# Patient Record
Sex: FEMALE | Race: WHITE | NOT HISPANIC OR LATINO | Employment: UNEMPLOYED | URBAN - METROPOLITAN AREA
[De-identification: names, ages, dates, MRNs, and addresses within clinical notes are randomized per-mention and may not be internally consistent; named-entity substitution may affect disease eponyms.]

---

## 2020-05-28 ENCOUNTER — AMBULATORY - HEALTHEAST (OUTPATIENT)
Dept: SURGERY | Facility: CLINIC | Age: 55
End: 2020-05-28

## 2020-05-28 DIAGNOSIS — Z11.59 ENCOUNTER FOR SCREENING FOR OTHER VIRAL DISEASES: ICD-10-CM

## 2020-06-29 ENCOUNTER — RECORDS - HEALTHEAST (OUTPATIENT)
Dept: LAB | Facility: CLINIC | Age: 55
End: 2020-06-29

## 2020-06-29 LAB
ANION GAP SERPL CALCULATED.3IONS-SCNC: 13 MMOL/L (ref 5–18)
BUN SERPL-MCNC: 11 MG/DL (ref 8–22)
CALCIUM SERPL-MCNC: 9.6 MG/DL (ref 8.5–10.5)
CHLORIDE BLD-SCNC: 104 MMOL/L (ref 98–107)
CO2 SERPL-SCNC: 23 MMOL/L (ref 22–31)
CREAT SERPL-MCNC: 1.06 MG/DL (ref 0.6–1.1)
GFR SERPL CREATININE-BSD FRML MDRD: 54 ML/MIN/1.73M2
GLUCOSE BLD-MCNC: 107 MG/DL (ref 70–125)
POTASSIUM BLD-SCNC: 3.9 MMOL/L (ref 3.5–5)
SODIUM SERPL-SCNC: 140 MMOL/L (ref 136–145)

## 2020-07-08 ASSESSMENT — MIFFLIN-ST. JEOR: SCORE: 1436.83

## 2020-07-11 ENCOUNTER — AMBULATORY - HEALTHEAST (OUTPATIENT)
Dept: FAMILY MEDICINE | Facility: CLINIC | Age: 55
End: 2020-07-11

## 2020-07-11 DIAGNOSIS — Z11.59 ENCOUNTER FOR SCREENING FOR OTHER VIRAL DISEASES: ICD-10-CM

## 2020-07-14 ENCOUNTER — ANESTHESIA - HEALTHEAST (OUTPATIENT)
Dept: SURGERY | Facility: CLINIC | Age: 55
End: 2020-07-14

## 2020-07-14 ENCOUNTER — SURGERY - HEALTHEAST (OUTPATIENT)
Dept: SURGERY | Facility: CLINIC | Age: 55
End: 2020-07-14

## 2020-07-14 ASSESSMENT — MIFFLIN-ST. JEOR: SCORE: 1436.83

## 2021-06-04 VITALS — BODY MASS INDEX: 32.44 KG/M2 | WEIGHT: 190 LBS | HEIGHT: 64 IN

## 2021-06-09 NOTE — ANESTHESIA PREPROCEDURE EVALUATION
Anesthesia Evaluation      Patient summary reviewed   History of anesthetic complications     Airway    Pulmonary                           Cardiovascular   Exercise tolerance: > or = 4 METS  (+) , hypercholesterolemia,     (-) valvular problems/murmurs, past MI, CAD  ECG reviewed        Neuro/Psych      Comments: Migraine; Motion sickness; MS.    Endo/Other    (+) hypothyroidism, arthritis, obesity,      GI/Hepatic/Renal - negative ROS      Other findings: PONV.      Dental                         Anesthesia Plan  Planned anesthetic: general endotracheal and peripheral nerve block  Saphenous nerve block for POP per surgeon request. Scopolamine, Decadron, Zofran.  Diprivan infusion.  Magnesium, Ketamine (0.5 mg/kg).  ASA 2   Induction: intravenous   Anesthetic plan and risks discussed with: patient  Anesthesia plan special considerations: antiemetics,   Post-op plan: routine recovery

## 2021-06-09 NOTE — ANESTHESIA CARE TRANSFER NOTE
Last vitals:   Vitals:    07/14/20 1253   BP: 138/73   Pulse: (!) 102   Resp: 14   Temp: 36.6  C (97.9  F)   SpO2: 97%     Patient's level of consciousness is drowsy  Spontaneous respirations: yes  Maintains airway independently: yes  Dentition unchanged: yes  Oropharynx: oropharynx clear of all foreign objects    QCDR Measures:  ASA# 20 - Surgical Safety Checklist: WHO surgical safety checklist completed prior to induction    PQRS# 430 - Adult PONV Prevention: 4558F - Pt received => 2 anti-emetic agents (different classes) preop & intraop  ASA# 8 - Peds PONV Prevention: NA - Not pediatric patient, not GA or 2 or more risk factors NOT present  PQRS# 424 - Radha-op Temp Management: 4559F - At least one body temp DOCUMENTED => 35.5C or 95.9F within required timeframe  PQRS# 426 - PACU Transfer Protocol: - Transfer of care checklist used  ASA# 14 - Acute Post-op Pain: ASA14B - Patient did NOT experience pain >= 7 out of 10. Denies pain or nausea

## 2021-06-09 NOTE — ANESTHESIA POSTPROCEDURE EVALUATION
Patient: Chelsi Robertson  Procedure(s):  RIGHT TOTAL KNEE ARTHROPLASTY (Right)  Anesthesia type: general    Patient location: PACU  Last vitals:   Vitals Value Taken Time   /96 7/14/2020  1:30 PM   Temp 36.6  C (97.9  F) 7/14/2020 12:53 PM   Pulse 99 7/14/2020  1:34 PM   Resp 15 7/14/2020  1:34 PM   SpO2 90 % 7/14/2020  1:34 PM   Vitals shown include unvalidated device data.  Post vital signs: stable  Level of consciousness: awake and responds to simple questions  Post-anesthesia pain: pain controlled  Post-anesthesia nausea and vomiting: no  Pulmonary: unassisted, return to baseline  Cardiovascular: stable and blood pressure at baseline  Hydration: adequate  Anesthetic events: no    QCDR Measures:  ASA# 11 - Radha-op Cardiac Arrest: ASA11B - Patient did NOT experience unanticipated cardiac arrest  ASA# 12 - Radha-op Mortality Rate: ASA12B - Patient did NOT die  ASA# 13 - PACU Re-Intubation Rate: ASA13B - Patient did NOT require a new airway mgmt  ASA# 10 - Composite Anes Safety: ASA10A - No serious adverse event    Additional Notes:

## 2021-06-09 NOTE — ANESTHESIA PROCEDURE NOTES
Peripheral Block    Patient location during procedure: pre-op  Start time: 7/14/2020 10:32 AM  End time: 7/14/2020 10:40 AM  post-op analgesia per surgeon order as noted in medical record  Staffing:  Performing  Anesthesiologist: Kermit Tavares MD  Preanesthetic Checklist  Completed: patient identified, site marked, risks, benefits, and alternatives discussed, timeout performed, consent obtained, at patient's request, airway assessed, oxygen available, suction available, emergency drugs available and hand hygiene performed  Peripheral Block  Block type: saphenous, adductor canal block  Prep: ChloraPrep  Patient position: supine  Patient monitoring: cardiac monitor, continuous pulse oximetry, blood pressure and heart rate  Laterality: right  Injection technique: ultrasound guided    Ultrasound used to visualize needle placement in proximity to nerve being blocked: yes   US used to visualize anesthetic spread  Visualized anatomic structures normal  No Pathological Findings  Permanent ultrasound image captured for medical record  Sterile gel and probe cover used for ultrasound.  Needle  Needle type: Stimuplex   Needle gauge: 20G  Needle length: 6 in  no peripheral nerve catheter placed  Assessment  Injection assessment: negative aspiration for heme, no difficulty with injection, no paresthesia on injection and incremental injection

## 2021-07-03 NOTE — ADDENDUM NOTE
Addendum Note by Shelby Daly RN at 5/28/2020 12:32 PM     Author: Shelby Daly RN Service: -- Author Type: Registered Nurse    Filed: 6/29/2020  8:59 AM Encounter Date: 5/28/2020 Status: Signed    : Shelby Daly RN (Registered Nurse)    Addended by: SHELBY DALY on: 6/29/2020 08:59 AM        Modules accepted: Orders

## 2022-04-15 ENCOUNTER — LAB REQUISITION (OUTPATIENT)
Dept: LAB | Facility: CLINIC | Age: 57
End: 2022-04-15

## 2022-04-15 DIAGNOSIS — E03.9 HYPOTHYROIDISM, UNSPECIFIED: ICD-10-CM

## 2022-04-15 LAB — TSH SERPL DL<=0.005 MIU/L-ACNC: 3.2 UIU/ML (ref 0.3–5)

## 2022-04-15 PROCEDURE — 84443 ASSAY THYROID STIM HORMONE: CPT | Performed by: PHYSICIAN ASSISTANT

## 2022-05-12 ENCOUNTER — TELEPHONE (OUTPATIENT)
Dept: NEUROLOGY | Facility: CLINIC | Age: 57
End: 2022-05-12
Payer: COMMERCIAL

## 2022-05-12 NOTE — TELEPHONE ENCOUNTER
[facility] Call Center    Phone Message    May a detailed message be left on voicemail: yes     Reason for Call: Other: Pt called to schedule apt with Dr. Montero. Pt saw Dr. Montero at [facility]placed by [facility] for  [facility] and wants to continue ans prefers [facility].    Please call Pt to schedule [phone] when ready to schedule.    Action Taken: Message routed to:  [facility] ([facility] Neurology    Travel Screening: Not Applicable

## 2022-06-02 ENCOUNTER — OFFICE VISIT (OUTPATIENT)
Dept: NEUROLOGY | Facility: CLINIC | Age: 57
End: 2022-06-02
Payer: COMMERCIAL

## 2022-06-02 ENCOUNTER — TELEPHONE (OUTPATIENT)
Dept: NEUROLOGY | Facility: CLINIC | Age: 57
End: 2022-06-02

## 2022-06-02 VITALS — HEART RATE: 94 BPM | SYSTOLIC BLOOD PRESSURE: 118 MMHG | DIASTOLIC BLOOD PRESSURE: 93 MMHG

## 2022-06-02 DIAGNOSIS — F41.9 ANXIETY: ICD-10-CM

## 2022-06-02 DIAGNOSIS — G81.91 RIGHT HEMIPLEGIA (H): ICD-10-CM

## 2022-06-02 DIAGNOSIS — G35 MS (MULTIPLE SCLEROSIS) (H): Primary | ICD-10-CM

## 2022-06-02 DIAGNOSIS — M85.852 OSTEOPENIA OF LEFT HIP: ICD-10-CM

## 2022-06-02 DIAGNOSIS — F51.04 CHRONIC INSOMNIA: ICD-10-CM

## 2022-06-02 DIAGNOSIS — G82.50 SPASTIC QUADRIPARESIS (H): ICD-10-CM

## 2022-06-02 DIAGNOSIS — G35 MULTIPLE SCLEROSIS (H): ICD-10-CM

## 2022-06-02 PROCEDURE — 99215 OFFICE O/P EST HI 40 MIN: CPT | Performed by: PSYCHIATRY & NEUROLOGY

## 2022-06-02 RX ORDER — NALOXONE HYDROCHLORIDE 0.4 MG/ML
0.2 INJECTION, SOLUTION INTRAMUSCULAR; INTRAVENOUS; SUBCUTANEOUS
Status: CANCELLED | OUTPATIENT
Start: 2022-06-02

## 2022-06-02 RX ORDER — ALBUTEROL SULFATE 0.83 MG/ML
2.5 SOLUTION RESPIRATORY (INHALATION)
Status: CANCELLED | OUTPATIENT
Start: 2022-06-02

## 2022-06-02 RX ORDER — LORAZEPAM 0.5 MG/1
0.5 TABLET ORAL
Qty: 30 TABLET | Refills: 1 | Status: SHIPPED | OUTPATIENT
Start: 2022-06-02 | End: 2023-06-07

## 2022-06-02 RX ORDER — TRAZODONE HYDROCHLORIDE 50 MG/1
100 TABLET, FILM COATED ORAL AT BEDTIME
Qty: 60 TABLET | Refills: 11 | Status: SHIPPED | OUTPATIENT
Start: 2022-06-02 | End: 2023-06-12

## 2022-06-02 RX ORDER — HEPARIN SODIUM (PORCINE) LOCK FLUSH IV SOLN 100 UNIT/ML 100 UNIT/ML
5 SOLUTION INTRAVENOUS
Status: CANCELLED | OUTPATIENT
Start: 2022-06-02

## 2022-06-02 RX ORDER — EPINEPHRINE 1 MG/ML
0.3 INJECTION, SOLUTION, CONCENTRATE INTRAVENOUS EVERY 5 MIN PRN
Status: CANCELLED | OUTPATIENT
Start: 2022-06-02

## 2022-06-02 RX ORDER — ALBUTEROL SULFATE 90 UG/1
1-2 AEROSOL, METERED RESPIRATORY (INHALATION)
Status: CANCELLED
Start: 2022-06-02

## 2022-06-02 RX ORDER — HEPARIN SODIUM,PORCINE 10 UNIT/ML
5 VIAL (ML) INTRAVENOUS
Status: CANCELLED | OUTPATIENT
Start: 2022-06-02

## 2022-06-02 RX ORDER — MEPERIDINE HYDROCHLORIDE 25 MG/ML
25 INJECTION INTRAMUSCULAR; INTRAVENOUS; SUBCUTANEOUS EVERY 30 MIN PRN
Status: CANCELLED | OUTPATIENT
Start: 2022-06-02

## 2022-06-02 RX ORDER — DIPHENHYDRAMINE HYDROCHLORIDE 50 MG/ML
50 INJECTION INTRAMUSCULAR; INTRAVENOUS
Status: CANCELLED
Start: 2022-06-02

## 2022-06-02 RX ORDER — DEXTROAMPHETAMINE SACCHARATE, AMPHETAMINE ASPARTATE, DEXTROAMPHETAMINE SULFATE AND AMPHETAMINE SULFATE 3.75; 3.75; 3.75; 3.75 MG/1; MG/1; MG/1; MG/1
30 TABLET ORAL 2 TIMES DAILY
Qty: 120 TABLET | Refills: 0 | Status: SHIPPED | OUTPATIENT
Start: 2022-06-02 | End: 2022-08-16

## 2022-06-02 RX ORDER — METHYLPREDNISOLONE SODIUM SUCCINATE 125 MG/2ML
125 INJECTION, POWDER, LYOPHILIZED, FOR SOLUTION INTRAMUSCULAR; INTRAVENOUS
Status: CANCELLED
Start: 2022-06-02

## 2022-06-02 NOTE — TELEPHONE ENCOUNTER
Signed plan for one dose    Patient was instructed to get DEXA scan before additional doses will be orderd  Gale Montero MD on 6/2/2022 at 12:57 PM

## 2022-06-02 NOTE — LETTER
6/2/2022         RE: Chelsi Robertson  [address] [address]        Dear Colleague,    Thank you for referring your patient, Chelsi Robertson, to the [facility]. Please see a copy of my visit note below.    Date of Service: 6/2/2022    [facility] Neurology   [facility] Follow-up     Subjective: 57-year-old woman who presents in follow-up for multiple sclerosis.    There have been a number of events since her last visit with me.  I most recently saw her on February 7.  At that time she was struggling significantly with stress at work.  She was noticing impairments with her cognition and difficulty keeping up with her workload.  She is considering short-term disability leave.    She continued working through the end of March.  Her last full day of work was March 30.  She attended work on April 4, but simply could not complete the day.    She notes that her workload was much more than she could manage.  Each client would have multiple forms that she would have to fill out.  They required her to type.  Her right hand was getting weaker.  Her entire right arm would go numb.  She would then have to completely rely on her left hand.  But her left hand would become fatigued within 2 hours of work.  She also had difficulty maintaining attention on a task because her attention and short-term memory were so poor.  She would forget to complete case notes because she was so busy trying to keep up with the forms.    She is no longer able to cook because her right hand is so weak.  She is not able to cut food.    She has been resting for the past month.  She notes that she is just now starting to feel better.  She is sleeping a lot.  She wears slippers because her shoes are difficult to get on.  She is no longer driving because it is difficult for her if she goes any longer than a very short distance.    She continues to use glatiramer acetate and reports tolerating this  well.    She has not experienced any new symptoms related to multiple sclerosis, she simply notices the progression of chronic symptoms.    She is interested in resuming monthly steroids.  She is agreeable to updating imaging to check in on her history of osteopenia.    No Known Allergies    Current Outpatient Medications   Medication     amphetamine-dextroamphetamine (ADDERALL) 15 MG tablet     DULoxetine (CYMBALTA) 30 MG capsule     gabapentin (NEURONTIN) 300 MG capsule     glatiramer (COPAXONE) 40 mg/mL Syrg injection     levothyroxine (SYNTHROID, LEVOTHROID) 75 MCG tablet     LORazepam (ATIVAN) 0.5 MG tablet     SUMAtriptan (IMITREX) 50 MG tablet     tiZANidine (ZANAFLEX) 4 MG tablet     traZODone (DESYREL) 50 MG tablet     triamcinolone (KENALOG) 0.1 % ointment     cholecalciferol, vitamin D3, (VITAMIN D3) 5,000 unit Tab     No current facility-administered medications for this visit.        Past medical, surgical, social and family history was personally reviewed. Pertinent details noted above.     Physical Examination:   BP (!) 118/93 (BP Location: Right arm, Patient Position: Sitting)   Pulse 94     General: no acute distress  Cranial nerves:   VFFC  EOM full w/no ABIGAIL   Face symmetric  Hearing intact  No dysarthria   Motor:   Tone is increased in the right > left lower extremities  Bulk is reduced in the right hand    R L  Deltoid  5- 5  Biceps  5- 5  Triceps 4+ 5  Wrist ext 2 4+  Finger ext 1 4+  Finger abd 1 4+    Hip flexion 1 4  Knee flexion 4 5-  Knee ext 5- 5  Ankle d/f 2 4+    Reflexes: increased on left side, reduced on the right side  Sensory: vibration is moderately reduced in the ankles, mildly reduced in the hands, JPS intact in UE  Romberg is not assessed  Coordination: sensory ataxia right upper extremity   Gait: wide based right hemiplegic with assist of walker    Tests/Imaging:   MRI brain 2010 - multiple CNS lesions c/w ms, no enhancing brain lesions  6/2011 - 2 new lesions, 1 melinda+  1/2017  "- no new lesions, gd-, some progression of atrophy  7/2020 - no new lesions, gd-    MRI cervical spine 2010 - lesions at C2-3 and C3 w/o enhancement  1/2017 - no new lesions, gd-, some spondylosis  7/2020 - no new lesions, gd-    MRI thoracic spine 2010 - no thoracic cord lesions  7/2020 - no new lesions, gd-    DEXA 1/2019 - osteopenia    NP eval 4/15/19  Impaired problem-solving and complex visual attention  Subtle impairment in \"retrieval based naming\"  Relative impairment of executive functioning with variable auditory and visual attention    Assessment: 57-year-old woman with secondary progressive multiple sclerosis.  She has experienced a clinical decline that is attributable to her progressive phase of the disease.    I support her in pursuing short-term disability.  Long-term disability is also reasonable as her cognitive and physical impairments are not expected to improve, and are likely to only continue to worsen.    It should be noted that she had a neuropsychological examination in 2019 that revealed only mild impairments in executive functioning.  However, this is expected to worsen in the setting of stress and in busy environments.  The testing is performed in a relatively protected environment, which is not reflective of a typical work environment or workday.    She has considerable right hand weakness with no fine motor skills in the right hand, and is also experiencing fatigable weakness in the left hand.    Plan:   -Continue with tizanidine for management of muscle spasticity  - Continue Adderall for MS related fatigue and cognitive changes  - Okay to use lorazepam on a limited basis for management of anxiety  - Trazodone for chronic insomnia  - Home care referral placed for occupational therapy and physical therapy  - DEXA scan to reassess for osteoporosis  - IV steroids x1 now, but will resume monthly infusions if DEXA scan does not reveal progression of bone disease  - Follow-up in 3 " months    Note was completed with the assistance of Dragon Fluency software which can often result in accidental word substitutions.     A total of 45 minutes on the date of service were spent in the care of this patient.   Gale Montero MD on 6/2/2022 at 10:52 AM          Again, thank you for allowing me to participate in the care of your patient.        Sincerely,        Gale Montero MD

## 2022-06-02 NOTE — TELEPHONE ENCOUNTER
Mary Beth to receive monthly steroid infusions.  Would like to infuse at [facility].  Mary Beth provided the infusion scheduling number. Therapy plan routed to Dr. Montero for signature.    Sasha Dominique RN

## 2022-06-02 NOTE — PATIENT INSTRUCTIONS
I will support your leave    I have referred you to home care     Refills provided    Steroid monthly x 1  You need the dexa scan before additional doses    Follow up in 3-4 months

## 2022-06-02 NOTE — PROGRESS NOTES
Date of Service: 6/2/2022    [facility] Neurology   [facility] Clinic Follow-up     Subjective: 57-year-old woman who presents in follow-up for multiple sclerosis.    There have been a number of events since her last visit with me.  I most recently saw her on February 7.  At that time she was struggling significantly with stress at work.  She was noticing impairments with her cognition and difficulty keeping up with her workload.  She is considering short-term disability leave.    She continued working through the end of March.  Her last full day of work was March 30.  She attended work on April 4, but simply could not complete the day.    She notes that her workload was much more than she could manage.  Each client would have multiple forms that she would have to fill out.  They required her to type.  Her right hand was getting weaker.  Her entire right arm would go numb.  She would then have to completely rely on her left hand.  But her left hand would become fatigued within 2 hours of work.  She also had difficulty maintaining attention on a task because her attention and short-term memory were so poor.  She would forget to complete case notes because she was so busy trying to keep up with the forms.    She is no longer able to cook because her right hand is so weak.  She is not able to cut food.    She has been resting for the past month.  She notes that she is just now starting to feel better.  She is sleeping a lot.  She wears slippers because her shoes are difficult to get on.  She is no longer driving because it is difficult for her if she goes any longer than a very short distance.    She continues to use glatiramer acetate and reports tolerating this well.    She has not experienced any new symptoms related to multiple sclerosis, she simply notices the progression of chronic symptoms.    She is interested in resuming monthly steroids.  She is agreeable to updating imaging to check in on her history of  osteopenia.    No Known Allergies    Current Outpatient Medications   Medication     amphetamine-dextroamphetamine (ADDERALL) 15 MG tablet     DULoxetine (CYMBALTA) 30 MG capsule     gabapentin (NEURONTIN) 300 MG capsule     glatiramer (COPAXONE) 40 mg/mL Syrg injection     levothyroxine (SYNTHROID, LEVOTHROID) 75 MCG tablet     LORazepam (ATIVAN) 0.5 MG tablet     SUMAtriptan (IMITREX) 50 MG tablet     tiZANidine (ZANAFLEX) 4 MG tablet     traZODone (DESYREL) 50 MG tablet     triamcinolone (KENALOG) 0.1 % ointment     cholecalciferol, vitamin D3, (VITAMIN D3) 5,000 unit Tab     No current facility-administered medications for this visit.        Past medical, surgical, social and family history was personally reviewed. Pertinent details noted above.     Physical Examination:   BP (!) 118/93 (BP Location: Right arm, Patient Position: Sitting)   Pulse 94     General: no acute distress  Cranial nerves:   VFFC  EOM full w/no ABIGAIL   Face symmetric  Hearing intact  No dysarthria   Motor:   Tone is increased in the right > left lower extremities  Bulk is reduced in the right hand    R L  Deltoid  5- 5  Biceps  5- 5  Triceps 4+ 5  Wrist ext 2 4+  Finger ext 1 4+  Finger abd 1 4+    Hip flexion 1 4  Knee flexion 4 5-  Knee ext 5- 5  Ankle d/f 2 4+    Reflexes: increased on left side, reduced on the right side  Sensory: vibration is moderately reduced in the ankles, mildly reduced in the hands, JPS intact in UE  Romberg is not assessed  Coordination: sensory ataxia right upper extremity   Gait: wide based right hemiplegic with assist of walker    Tests/Imaging:   MRI brain 2010 - multiple CNS lesions c/w ms, no enhancing brain lesions  6/2011 - 2 new lesions, 1 melinda+  1/2017 - no new lesions, gd-, some progression of atrophy  7/2020 - no new lesions, gd-    MRI cervical spine 2010 - lesions at C2-3 and C3 w/o enhancement  1/2017 - no new lesions, gd-, some spondylosis  7/2020 - no new lesions, gd-    MRI thoracic spine 2010 -  "no thoracic cord lesions  7/2020 - no new lesions, gd-    DEXA 1/2019 - osteopenia    NP eval 4/15/19  Impaired problem-solving and complex visual attention  Subtle impairment in \"retrieval based naming\"  Relative impairment of executive functioning with variable auditory and visual attention    Assessment: 57-year-old woman with secondary progressive multiple sclerosis.  She has experienced a clinical decline that is attributable to her progressive phase of the disease.    I support her in pursuing short-term disability.  Long-term disability is also reasonable as her cognitive and physical impairments are not expected to improve, and are likely to only continue to worsen.    It should be noted that she had a neuropsychological examination in 2019 that revealed only mild impairments in executive functioning.  However, this is expected to worsen in the setting of stress and in busy environments.  The testing is performed in a relatively protected environment, which is not reflective of a typical work environment or workday.    She has considerable right hand weakness with no fine motor skills in the right hand, and is also experiencing fatigable weakness in the left hand.    Plan:   -Continue with tizanidine for management of muscle spasticity  - Continue Adderall for MS related fatigue and cognitive changes  - Okay to use lorazepam on a limited basis for management of anxiety  - Trazodone for chronic insomnia  - Home care referral placed for occupational therapy and physical therapy  - DEXA scan to reassess for osteoporosis  - IV steroids x1 now, but will resume monthly infusions if DEXA scan does not reveal progression of bone disease  - Follow-up in 3 months    Note was completed with the assistance of Dragon Fluency software which can often result in accidental word substitutions.     A total of 45 minutes on the date of service were spent in the care of this patient.   Gale Montero MD on 6/2/2022 at " 10:52 AM

## 2022-06-02 NOTE — NURSING NOTE
Chief Complaint   Patient presents with     Multiple Sclerosis     Follow-up           MANISH Garcia on 6/2/2022 at 10:54 AM

## 2022-06-06 RX ORDER — GLATIRAMER 40 MG/ML
40 INJECTION, SOLUTION SUBCUTANEOUS
Qty: 12 ML | Refills: 11 | Status: SHIPPED | OUTPATIENT
Start: 2022-06-06 | End: 2023-01-16

## 2022-07-17 ENCOUNTER — HEALTH MAINTENANCE LETTER (OUTPATIENT)
Age: 57
End: 2022-07-17

## 2022-07-19 DIAGNOSIS — G35 MULTIPLE SCLEROSIS (H): Primary | ICD-10-CM

## 2022-07-19 NOTE — TELEPHONE ENCOUNTER
Received refill request for duloxetine from [facility]; Patient was last seen on  6/2/2022 and has follow up appointment on 10/3/2022 with Dr Montero. Pended to MS pool for review/approval    Kimberly Ellis MA

## 2022-07-20 RX ORDER — DULOXETIN HYDROCHLORIDE 30 MG/1
60 CAPSULE, DELAYED RELEASE ORAL EVERY EVENING
Qty: 60 CAPSULE | Refills: 3 | Status: SHIPPED | OUTPATIENT
Start: 2022-07-20 | End: 2022-12-02

## 2022-08-16 ENCOUNTER — MYC REFILL (OUTPATIENT)
Dept: NEUROLOGY | Facility: CLINIC | Age: 57
End: 2022-08-16

## 2022-08-16 DIAGNOSIS — G35 MS (MULTIPLE SCLEROSIS) (H): ICD-10-CM

## 2022-08-16 DIAGNOSIS — M79.2 NEUROPATHIC PAIN: Primary | ICD-10-CM

## 2022-08-16 NOTE — TELEPHONE ENCOUNTER
Received refill request for Gabapentin from [facility]; Patient was last seen on 6/2/2022 and has follow up appointment on 10/03/2022 with [place]. Pended to MS pool for review/approval    Kimberly Ellis MA

## 2022-08-17 RX ORDER — DEXTROAMPHETAMINE SACCHARATE, AMPHETAMINE ASPARTATE, DEXTROAMPHETAMINE SULFATE AND AMPHETAMINE SULFATE 3.75; 3.75; 3.75; 3.75 MG/1; MG/1; MG/1; MG/1
30 TABLET ORAL 2 TIMES DAILY
Qty: 120 TABLET | Refills: 0 | Status: SHIPPED | OUTPATIENT
Start: 2022-08-17 | End: 2022-12-29

## 2022-08-17 NOTE — TELEPHONE ENCOUNTER
Confirming current dose with pt. Refill encounter from  on 12/29 states 300 mg in AM and 600 mg at bedtime. Gabapentin not discussed in latest clinic note.     Francesca Moncada RN

## 2022-08-17 NOTE — TELEPHONE ENCOUNTER
Have not heard back from pt. Gabapentin rx updated to reflect [facility] documentation (refill encounter 12/29/21) and routed to Dr Montero for signature.    Francesca Moncada RN

## 2022-08-17 NOTE — TELEPHONE ENCOUNTER
Patient requesting refill of their Adderall; Patient was last seen in June and has follow up appointment in October with Dr Montero. Pended rx to Dr Montero for signature and will send electronically to the pharmacy once signed.    Francesca Moncada RN

## 2022-08-18 RX ORDER — GABAPENTIN 300 MG/1
CAPSULE ORAL
Qty: 270 CAPSULE | Refills: 1 | Status: SHIPPED | OUTPATIENT
Start: 2022-08-18 | End: 2023-01-16

## 2022-09-25 ENCOUNTER — HEALTH MAINTENANCE LETTER (OUTPATIENT)
Age: 57
End: 2022-09-25

## 2022-12-02 DIAGNOSIS — G35 MULTIPLE SCLEROSIS (H): ICD-10-CM

## 2022-12-02 RX ORDER — DULOXETIN HYDROCHLORIDE 30 MG/1
60 CAPSULE, DELAYED RELEASE ORAL EVERY EVENING
Qty: 60 CAPSULE | Refills: 0 | Status: SHIPPED | OUTPATIENT
Start: 2022-12-02 | End: 2022-12-28

## 2022-12-02 NOTE — TELEPHONE ENCOUNTER
Medication refill request for DULoxetine (CYMBALTA) 30 MG capsule.     Last Written Prescription Date:  7/20/2022  Last Fill Quantity: 60,  # refills: 3  Last office visit provider:  6/2/2022  Next appointment scheduled: None. Pt is due for a follow up appt and no appt made yet. Will send a [employer] message to the pt as a reminder to call clinic for a follow up.      Medication T'd for review and signature    MANISH Garcia on 12/2/2022 at 2:44 PM

## 2022-12-28 ENCOUNTER — MYC MEDICAL ADVICE (OUTPATIENT)
Dept: NEUROLOGY | Facility: CLINIC | Age: 57
End: 2022-12-28

## 2022-12-28 ENCOUNTER — MYC REFILL (OUTPATIENT)
Dept: NEUROLOGY | Facility: CLINIC | Age: 57
End: 2022-12-28

## 2022-12-28 DIAGNOSIS — G35 MS (MULTIPLE SCLEROSIS) (H): ICD-10-CM

## 2022-12-28 DIAGNOSIS — G35 MULTIPLE SCLEROSIS (H): ICD-10-CM

## 2022-12-28 DIAGNOSIS — G35 MS (MULTIPLE SCLEROSIS) (H): Primary | ICD-10-CM

## 2022-12-28 RX ORDER — PREDNISONE 50 MG/1
1250 TABLET ORAL ONCE
Qty: 25 TABLET | Refills: 0 | Status: SHIPPED | OUTPATIENT
Start: 2022-12-28 | End: 2022-12-28

## 2022-12-28 RX ORDER — DEXTROAMPHETAMINE SACCHARATE, AMPHETAMINE ASPARTATE, DEXTROAMPHETAMINE SULFATE AND AMPHETAMINE SULFATE 3.75; 3.75; 3.75; 3.75 MG/1; MG/1; MG/1; MG/1
30 TABLET ORAL 2 TIMES DAILY
Qty: 120 TABLET | Refills: 0 | Status: CANCELLED | OUTPATIENT
Start: 2022-12-28

## 2022-12-28 NOTE — TELEPHONE ENCOUNTER
Dr. Montero- please advise if you can prescribe PO steroids, pt struggling with extreme weakness, worse with cold temperatures.     No new symptoms.     Her rx for steroids  in Nov. She is agreeable to complete bone density scan that was ordered in . RN provided information for her to schedule this.     Srinivas Guidry RN, BSN  [facility] Neurology

## 2022-12-29 RX ORDER — DEXTROAMPHETAMINE SACCHARATE, AMPHETAMINE ASPARTATE, DEXTROAMPHETAMINE SULFATE AND AMPHETAMINE SULFATE 3.75; 3.75; 3.75; 3.75 MG/1; MG/1; MG/1; MG/1
30 TABLET ORAL 2 TIMES DAILY
Qty: 120 TABLET | Refills: 0 | Status: SHIPPED | OUTPATIENT
Start: 2022-12-29 | End: 2023-03-31

## 2022-12-29 NOTE — TELEPHONE ENCOUNTER
Order for one dose prednisone placed    Patient missed her last appointment   Please have her reschedule   This will be needed before additional doses of steroids   Gale Montero MD on 12/28/2022 at 6:13 PM

## 2022-12-29 NOTE — TELEPHONE ENCOUNTER
Pending Prescriptions:                       Disp   Refills    amphetamine-dextroamphetamine (ADDERALL) *120 ta*0            Sig: Take 2 tablets (30 mg) by mouth 2 times daily    Signed Prescriptions:                        Disp   Refills    predniSONE (DELTASONE) 50 MG tablet        25 tab*0        Sig: Take 25 tablets (1,250 mg) by mouth once for 1 dose  Authorizing Provider: SANYA SALGADO    Pt requesting refill of adderall. T'd for provider to sign. Pt scheduled for 1/16 follow up    Srinivas Guidry, RN, BSN  [facility] Neurology

## 2022-12-29 NOTE — TELEPHONE ENCOUNTER
Rx for amphetamine-dextroamphetamine (ADDERALL) 15 MG tablet was sent to [id number] - [place], [place] - [address] [facility] [address]    Sent a [employer] message to the pt to let her know that the Rx has been sent.       MANISH Garcia on 12/29/2022 at 10:58 AM

## 2022-12-30 ENCOUNTER — TELEPHONE (OUTPATIENT)
Dept: NEUROLOGY | Facility: CLINIC | Age: 57
End: 2022-12-30

## 2022-12-30 RX ORDER — DULOXETIN HYDROCHLORIDE 30 MG/1
60 CAPSULE, DELAYED RELEASE ORAL EVERY EVENING
Qty: 60 CAPSULE | Refills: 0 | Status: SHIPPED | OUTPATIENT
Start: 2022-12-30 | End: 2023-01-16

## 2023-01-16 ENCOUNTER — TELEPHONE (OUTPATIENT)
Dept: NEUROLOGY | Facility: CLINIC | Age: 58
End: 2023-01-16

## 2023-01-16 ENCOUNTER — OFFICE VISIT (OUTPATIENT)
Dept: NEUROLOGY | Facility: CLINIC | Age: 58
End: 2023-01-16
Payer: COMMERCIAL

## 2023-01-16 ENCOUNTER — ANCILLARY PROCEDURE (OUTPATIENT)
Dept: BONE DENSITY | Facility: CLINIC | Age: 58
End: 2023-01-16
Attending: PSYCHIATRY & NEUROLOGY
Payer: COMMERCIAL

## 2023-01-16 VITALS — HEART RATE: 81 BPM | DIASTOLIC BLOOD PRESSURE: 104 MMHG | SYSTOLIC BLOOD PRESSURE: 134 MMHG

## 2023-01-16 DIAGNOSIS — G82.50 QUADRIPARESIS (H): ICD-10-CM

## 2023-01-16 DIAGNOSIS — G35 MULTIPLE SCLEROSIS (H): Primary | ICD-10-CM

## 2023-01-16 DIAGNOSIS — N31.9 NEUROGENIC BLADDER: ICD-10-CM

## 2023-01-16 DIAGNOSIS — M79.2 NEUROPATHIC PAIN: ICD-10-CM

## 2023-01-16 DIAGNOSIS — M85.852 OSTEOPENIA OF LEFT HIP: ICD-10-CM

## 2023-01-16 PROCEDURE — 77080 DXA BONE DENSITY AXIAL: CPT | Mod: TC | Performed by: RADIOLOGY

## 2023-01-16 PROCEDURE — 99214 OFFICE O/P EST MOD 30 MIN: CPT | Performed by: PSYCHIATRY & NEUROLOGY

## 2023-01-16 RX ORDER — GABAPENTIN 300 MG/1
900 CAPSULE ORAL AT BEDTIME
Qty: 270 CAPSULE | Refills: 3 | Status: SHIPPED | OUTPATIENT
Start: 2023-01-16 | End: 2024-01-03

## 2023-01-16 RX ORDER — GLATIRAMER 40 MG/ML
40 INJECTION, SOLUTION SUBCUTANEOUS
Qty: 12 ML | Refills: 11 | Status: SHIPPED | OUTPATIENT
Start: 2023-01-16

## 2023-01-16 RX ORDER — DULOXETIN HYDROCHLORIDE 60 MG/1
60 CAPSULE, DELAYED RELEASE ORAL EVERY EVENING
Qty: 90 CAPSULE | Refills: 3 | Status: SHIPPED | OUTPATIENT
Start: 2023-01-16 | End: 2023-04-04

## 2023-01-16 ASSESSMENT — PATIENT HEALTH QUESTIONNAIRE - PHQ9: SUM OF ALL RESPONSES TO PHQ QUESTIONS 1-9: 12

## 2023-01-16 NOTE — PATIENT INSTRUCTIONS
Follow up with [facility] to get your glatiramer -- it looks like you have not filled this for more than 6 months (?)     Once your dexa scan returns, I will order more steroids   But you are not due for steroids until the end of the month     Continue duloxetine and gabapentin for nerve pain     I think you need to see urology -- I have given you information on a good option     Follow up with me in 4 months

## 2023-01-16 NOTE — LETTER
1/16/2023         RE: Chelsi Robertson  [address]  [facility] [place] [address]        Dear Colleague,    Thank you for referring your patient, Chelsi Robertson, to the [facility]. Please see a copy of my visit note below.    Date of Service: 1/16/2023    [facility] Neurology   MS Clinic Follow-up     Subjective: 58-year-old woman who presents in follow-up for multiple sclerosis.    She does not report any new symptoms related to multiple sclerosis.    She has had a particularly stressful past 6 months.  Her older son was diagnosed with a condition that required him to undergo a bone marrow transplant.  Her younger son recently had sole shoulder surgery and is requiring assistance with caring for his dog.  She is therefore trying to care for multiple dogs.    Fortunately, she has not had any major falls since her last visit with me.  She continues to walk around her home with the assistance of a walker.    Her right hand continues to get weaker.  She does have a hand brace, but struggles to use it because it gets in the way of her going to the bathroom.    The biggest issue she has had lately is incontinence.  Sometimes the incontinence will occur due to urgency.  Sometimes it will occur without warning.  She notices that after she goes to the bathroom if she gets up and once the water, she will have to go to the bathroom again.  A large amount of volume will come out the second time.  She is hesitant to see a urologist.    She is uncertain when she last injected glatiramer acetate.  From the information that I have available to review she has not filled the medication in over 6 months.    She appropriately had a DEXA scan today.  This is to assess bone health with chronic steroid use.  Her last round of steroids was given in the end of December.  She notes that the effectiveness is already wearing off.    No Known Allergies    Current Outpatient Medications   Medication      "amphetamine-dextroamphetamine (ADDERALL) 15 MG tablet     DULoxetine (CYMBALTA) 30 MG capsule     gabapentin (NEURONTIN) 300 MG capsule     glatiramer acetate 40 MG/ML injection     levothyroxine (SYNTHROID, LEVOTHROID) 75 MCG tablet     LORazepam (ATIVAN) 0.5 MG tablet     SUMAtriptan (IMITREX) 50 MG tablet     tiZANidine (ZANAFLEX) 4 MG tablet     traZODone (DESYREL) 50 MG tablet     triamcinolone (KENALOG) 0.1 % ointment     cholecalciferol, vitamin D3, (VITAMIN D3) 5,000 unit Tab     No current facility-administered medications for this visit.        Past medical, surgical, social and family history was personally reviewed. Pertinent details noted above.     Physical Examination:   BP (!) 134/104 (BP Location: Right arm, Patient Position: Sitting)   Pulse 81     General: no acute distress    Tests/Imaging:   MRI brain 2010 - multiple CNS lesions c/w ms, no enhancing brain lesions  6/2011 - 2 new lesions, 1 melinda+  1/2017 - no new lesions, gd-, some progression of atrophy  7/2020 - no new lesions, gd-    MRI cervical spine 2010 - lesions at C2-3 and C3 w/o enhancement  1/2017 - no new lesions, gd-, some spondylosis  7/2020 - no new lesions, gd-    MRI thoracic spine 2010 - no thoracic cord lesions  7/2020 - no new lesions, gd-    DEXA 1/2019 - osteopenia    NP eval 4/15/19  Impaired problem-solving and complex visual attention  Subtle impairment in \"retrieval based naming\"  Relative impairment of executive functioning with variable auditory and visual attention    Assessment: 58-year-old woman with secondary progressive multiple sclerosis.  She has experienced a clinical decline that is attributable to her progressive phase of the disease.    We discussed the importance of visiting with a urologist.  A name for a urologist was provided for her today.  We discussed how this can have a big impact on quality of life.  She has symptoms that are consistent with neurogenic bladder.    She should continue with glatiramer " acetate.  I encouraged her to check in with her specialty pharmacy to get this going again.    She is pending results of the DEXA scan.  If this reveals no change in her degree of osteopenia she can continue with monthly steroids.    She will continue with her chronic medications for management of chronic symptoms related to multiple sclerosis.    Plan:   -Continue with tizanidine for management of muscle spasticity  - Continue Adderall for MS related fatigue and cognitive changes  - Trazodone for chronic insomnia  - Resume glatiramer  - Encouraged urology visit  - Follow-up in 4 months    Note was completed with the assistance of Dragon Fluency software which can often result in accidental word substitutions.     A total of 30 minutes on the date of service were spent in the care of this patient.   Gale Montero MD on 1/16/2023 at 11:46 AM            Again, thank you for allowing me to participate in the care of your patient.        Sincerely,        Gale Montero MD

## 2023-01-16 NOTE — PROGRESS NOTES
Date of Service: 1/16/2023    [facility] Neurology   MS Clinic Follow-up     Subjective: 58-year-old woman who presents in follow-up for multiple sclerosis.    She does not report any new symptoms related to multiple sclerosis.    She has had a particularly stressful past 6 months.  Her older son was diagnosed with a condition that required him to undergo a bone marrow transplant.  Her younger son recently had sole shoulder surgery and is requiring assistance with caring for his dog.  She is therefore trying to care for multiple dogs.    Fortunately, she has not had any major falls since her last visit with me.  She continues to walk around her home with the assistance of a walker.    Her right hand continues to get weaker.  She does have a hand brace, but struggles to use it because it gets in the way of her going to the bathroom.    The biggest issue she has had lately is incontinence.  Sometimes the incontinence will occur due to urgency.  Sometimes it will occur without warning.  She notices that after she goes to the bathroom if she gets up and once the water, she will have to go to the bathroom again.  A large amount of volume will come out the second time.  She is hesitant to see a urologist.    She is uncertain when she last injected glatiramer acetate.  From the information that I have available to review she has not filled the medication in over 6 months.    She appropriately had a DEXA scan today.  This is to assess bone health with chronic steroid use.  Her last round of steroids was given in the end of December.  She notes that the effectiveness is already wearing off.    No Known Allergies    Current Outpatient Medications   Medication     amphetamine-dextroamphetamine (ADDERALL) 15 MG tablet     DULoxetine (CYMBALTA) 30 MG capsule     gabapentin (NEURONTIN) 300 MG capsule     glatiramer acetate 40 MG/ML injection     levothyroxine (SYNTHROID, LEVOTHROID) 75 MCG tablet     LORazepam (ATIVAN) 0.5 MG  "tablet     SUMAtriptan (IMITREX) 50 MG tablet     tiZANidine (ZANAFLEX) 4 MG tablet     traZODone (DESYREL) 50 MG tablet     triamcinolone (KENALOG) 0.1 % ointment     cholecalciferol, vitamin D3, (VITAMIN D3) 5,000 unit Tab     No current facility-administered medications for this visit.        Past medical, surgical, social and family history was personally reviewed. Pertinent details noted above.     Physical Examination:   BP (!) 134/104 (BP Location: Right arm, Patient Position: Sitting)   Pulse 81     General: no acute distress    Tests/Imaging:   MRI brain 2010 - multiple CNS lesions c/w ms, no enhancing brain lesions  6/2011 - 2 new lesions, 1 melinda+  1/2017 - no new lesions, gd-, some progression of atrophy  7/2020 - no new lesions, gd-    MRI cervical spine 2010 - lesions at C2-3 and C3 w/o enhancement  1/2017 - no new lesions, gd-, some spondylosis  7/2020 - no new lesions, gd-    MRI thoracic spine 2010 - no thoracic cord lesions  7/2020 - no new lesions, gd-    DEXA 1/2019 - osteopenia    NP eval 4/15/19  Impaired problem-solving and complex visual attention  Subtle impairment in \"retrieval based naming\"  Relative impairment of executive functioning with variable auditory and visual attention    Assessment: 58-year-old woman with secondary progressive multiple sclerosis.  She has experienced a clinical decline that is attributable to her progressive phase of the disease.    We discussed the importance of visiting with a urologist.  A name for a urologist was provided for her today.  We discussed how this can have a big impact on quality of life.  She has symptoms that are consistent with neurogenic bladder.    She should continue with glatiramer acetate.  I encouraged her to check in with her specialty pharmacy to get this going again.    She is pending results of the DEXA scan.  If this reveals no change in her degree of osteopenia she can continue with monthly steroids.    She will continue with her " chronic medications for management of chronic symptoms related to multiple sclerosis.    Plan:   -Continue with tizanidine for management of muscle spasticity  - Continue Adderall for MS related fatigue and cognitive changes  - Trazodone for chronic insomnia  - Resume glatiramer  - Encouraged urology visit  - Follow-up in 4 months    Note was completed with the assistance of Dragon Fluency software which can often result in accidental word substitutions.     A total of 30 minutes on the date of service were spent in the care of this patient.   Gale Montero MD on 1/16/2023 at 11:46 AM

## 2023-01-16 NOTE — TELEPHONE ENCOUNTER
2023 insurance needed. I was unable to locate active coverage.A HW message was sent to the patient.    Thank you,    Anne Noble [facility]  Specialty Pharmacy Clinic Liaison - CardiologyNeurologyMultiple Sclerosis  [address] [place] [address]  Ph: [phone] Fax: [phone]  [email or web address]

## 2023-02-03 DIAGNOSIS — G35 MS (MULTIPLE SCLEROSIS) (H): Primary | ICD-10-CM

## 2023-02-03 RX ORDER — PREDNISONE 50 MG/1
TABLET ORAL
Qty: 25 TABLET | Refills: 0 | OUTPATIENT
Start: 2023-02-03

## 2023-02-03 RX ORDER — PREDNISONE 50 MG/1
TABLET ORAL
COMMUNITY
Start: 2022-12-29 | End: 2024-04-28

## 2023-02-03 NOTE — TELEPHONE ENCOUNTER
Per message to patient: Linda, it looks like your bones are getting thinner. I recommend that you stop steroids.  We might consider changing your MS treatments if you feel that things get worse without the steroids. Gale Montero MD

## 2023-02-03 NOTE — TELEPHONE ENCOUNTER
Refill request for Prednisone 50mg  Last follow-up 1/16/23; next follow-up 5/22/23  Medication T'd for review and signature  ZOHRA Joyce ATC on 2/3/2023 at 9:04 AM    predniSONE (DELTASONE) 50 MG tablet 25 tablet 0 12/28/2022 12/28/2022 --   Sig - Route: Take 25 tablets (1,250 mg) by mouth once for 1 dose - Oral

## 2023-03-31 ENCOUNTER — MYC REFILL (OUTPATIENT)
Dept: NEUROLOGY | Facility: CLINIC | Age: 58
End: 2023-03-31
Payer: COMMERCIAL

## 2023-03-31 DIAGNOSIS — G35 MS (MULTIPLE SCLEROSIS) (H): ICD-10-CM

## 2023-03-31 RX ORDER — DEXTROAMPHETAMINE SACCHARATE, AMPHETAMINE ASPARTATE, DEXTROAMPHETAMINE SULFATE AND AMPHETAMINE SULFATE 3.75; 3.75; 3.75; 3.75 MG/1; MG/1; MG/1; MG/1
30 TABLET ORAL 2 TIMES DAILY
Qty: 120 TABLET | Refills: 0 | Status: SHIPPED | OUTPATIENT
Start: 2023-03-31 | End: 2023-06-12

## 2023-03-31 NOTE — TELEPHONE ENCOUNTER
Medication refill request for amphetamine-dextroamphetamine (ADDERALL) 15 MG tablet.     Last Written Prescription Date:  12/29/2022  Last Fill Quantity: 120,  # refills: 0  Last office visit provider:  1/16/23    Next appointment scheduled:   5/22/2023 10:30 AM (Arrive by 10:15 AM) Gale Montero MD [facility] Neurology [facility]       Medication T'd for review and signature    AMNISH Garcia on 3/31/2023 at 12:42 PM

## 2023-04-04 DIAGNOSIS — G35 MULTIPLE SCLEROSIS (H): ICD-10-CM

## 2023-04-04 NOTE — TELEPHONE ENCOUNTER
Medication refill request for DULoxetine (CYMBALTA) 60 MG capsule.     Last Written Prescription Date:  1/16/2023  Last Fill Quantity: 90,  # refills: 3  Last office visit provider:  1/16/2023  Next appointment scheduled: 5/22/2023    Medication T'd for review and signature    MANISH Garcia on 4/4/2023 at 1:14 PM

## 2023-04-05 RX ORDER — DULOXETIN HYDROCHLORIDE 60 MG/1
60 CAPSULE, DELAYED RELEASE ORAL EVERY EVENING
Qty: 90 CAPSULE | Refills: 1 | Status: SHIPPED | OUTPATIENT
Start: 2023-04-05 | End: 2023-12-11

## 2023-06-07 DIAGNOSIS — F41.9 ANXIETY: ICD-10-CM

## 2023-06-07 RX ORDER — LORAZEPAM 0.5 MG/1
0.5 TABLET ORAL
Qty: 30 TABLET | Refills: 0 | Status: SHIPPED | OUTPATIENT
Start: 2023-06-07 | End: 2023-09-21

## 2023-06-07 NOTE — LETTER
June 7, 2023      Chelsi STEIN Marcelo  [address] [facility] [id number]        Dear Chelsi,     Your health care team has determined that you are due for an appointment. Many medications require routine follow-up with your doctor. We encourage you to call to schedule an appointment.    If you already have made a follow up appointment, please disregard this letter.    If you have any questions or need help with scheduling, please call the clinic at [phone].        Sincerely,       [facility]

## 2023-06-07 NOTE — TELEPHONE ENCOUNTER
Medication refill request for     LORazepam (ATIVAN) 0.5 MG tablet, Last date refilled:  6/2/2022, Last Fill Quantity: 30,  # refills: 1    Last office visit provider:  1/16/2023    Next appointment scheduled: None. Pt is due for a follow-up appt. Letter will be mailed to the pt as a reminder to call clinic to schedule an appt.    Medication T'd for review and signature    MANISH Garcia on 6/7/2023 at 8:56 AM

## 2023-06-12 ENCOUNTER — MYC REFILL (OUTPATIENT)
Dept: NEUROLOGY | Facility: CLINIC | Age: 58
End: 2023-06-12
Payer: COMMERCIAL

## 2023-06-12 DIAGNOSIS — G35 MS (MULTIPLE SCLEROSIS) (H): ICD-10-CM

## 2023-06-12 DIAGNOSIS — F51.04 CHRONIC INSOMNIA: ICD-10-CM

## 2023-06-12 DIAGNOSIS — G82.50 SPASTIC QUADRIPARESIS (H): ICD-10-CM

## 2023-06-12 RX ORDER — TRAZODONE HYDROCHLORIDE 50 MG/1
100 TABLET, FILM COATED ORAL AT BEDTIME
Qty: 60 TABLET | Refills: 0 | Status: SHIPPED | OUTPATIENT
Start: 2023-06-12 | End: 2023-07-14

## 2023-06-12 NOTE — TELEPHONE ENCOUNTER
Medication refill request for     traZODone (DESYREL) 50 MG tablet, Last date refilled:  6/2/2022, Last Fill Quantity: 60,  # refills: 11    Last office visit provider:  1/16/2023  Next appointment scheduled: None. Pt is due for a follow-up appt. Letter was mailed to the pt on 6/7/23 as a reminder to call clinic to schedule an appt.    Medication T'd for review and signature    MANISH Garcia on 6/12/2023 at 11:27 AM

## 2023-06-13 RX ORDER — DEXTROAMPHETAMINE SACCHARATE, AMPHETAMINE ASPARTATE, DEXTROAMPHETAMINE SULFATE AND AMPHETAMINE SULFATE 3.75; 3.75; 3.75; 3.75 MG/1; MG/1; MG/1; MG/1
30 TABLET ORAL 2 TIMES DAILY
Qty: 120 TABLET | Refills: 0 | Status: SHIPPED | OUTPATIENT
Start: 2023-06-13 | End: 2023-09-21

## 2023-06-13 NOTE — TELEPHONE ENCOUNTER
Medication refill request for     amphetamine-dextroamphetamine (ADDERALL) 15 MG tablet, Last date refilled:  3/31/2023, Last Fill Quantity: 120,  # refills: 0    Last office visit provider:  1/16/2023  Next appointment scheduled: None. Pt is due for a follow-up appt. Letter was mailed to the pt on 6/7/23 as a reminder to call clinic to schedule an appt.       Medication T'd for review and signature    MANISH Garcia on 6/13/2023 at 10:26 AM

## 2023-07-13 ENCOUNTER — TELEPHONE (OUTPATIENT)
Dept: NEUROLOGY | Facility: CLINIC | Age: 58
End: 2023-07-13
Payer: COMMERCIAL

## 2023-07-13 DIAGNOSIS — F51.04 CHRONIC INSOMNIA: ICD-10-CM

## 2023-07-13 NOTE — TELEPHONE ENCOUNTER
Rx refill request for traZODone (DESYREL) 50 MG tablet    Last refill; 06/12/23 60 tabs 0 refills    Last follow-up; 01/16/23  Next follow-up; none, will send a msg to  to call pt for an appt.    Medication T'd for review and signature  Agatha Benjamin MA on 7/13/2023 at 7:53 AM

## 2023-07-14 RX ORDER — TRAZODONE HYDROCHLORIDE 50 MG/1
100 TABLET, FILM COATED ORAL AT BEDTIME
Qty: 60 TABLET | Refills: 0 | Status: SHIPPED | OUTPATIENT
Start: 2023-07-14 | End: 2023-07-18

## 2023-07-18 ENCOUNTER — TELEPHONE (OUTPATIENT)
Dept: NEUROLOGY | Facility: CLINIC | Age: 58
End: 2023-07-18
Payer: COMMERCIAL

## 2023-07-18 DIAGNOSIS — F51.04 CHRONIC INSOMNIA: ICD-10-CM

## 2023-07-18 RX ORDER — TRAZODONE HYDROCHLORIDE 50 MG/1
100 TABLET, FILM COATED ORAL AT BEDTIME
Qty: 60 TABLET | Refills: 0 | Status: SHIPPED | OUTPATIENT
Start: 2023-07-18 | End: 2023-10-05

## 2023-07-18 NOTE — TELEPHONE ENCOUNTER
Please schedule Pt for next available follow up visit with Dr. Montero.    Thank you,   Agatha Benjamin MA on 7/18/2023 at 8:45 AM

## 2023-07-18 NOTE — TELEPHONE ENCOUNTER
Rx refill request for traZODone (DESYREL) 50 MG tablet     Last refill; 07/14/23 60 tabs 0 refills    Last follow-up; 01/16/23 Next follow-up; none, msg sent to  to call pt for an appt.

## 2023-07-21 NOTE — TELEPHONE ENCOUNTER
Saba Juarez; [facility] Scheduling 2 days ago       7/19 VM is still full. Sent Pt a [employer] message.   Michelle Latham; [facility] Scheduling 3 days ago       7/18/23  Un able to lm for patient to schedule with Dr Montero Mail box is full       Noted.  Agatha Benjamin MA on 7/21/2023 at 11:19 AM

## 2023-07-28 NOTE — TELEPHONE ENCOUNTER
Saba LESTER from  had sent a mychart msg to the Pt on 07/19/23 letting the Pt know to call the clinic to make an appt.     Agatha Benjamin MA on 7/28/2023 at 8:22 AM

## 2023-08-05 ENCOUNTER — HEALTH MAINTENANCE LETTER (OUTPATIENT)
Age: 58
End: 2023-08-05

## 2023-10-05 DIAGNOSIS — F51.04 CHRONIC INSOMNIA: ICD-10-CM

## 2023-10-06 ENCOUNTER — TELEPHONE (OUTPATIENT)
Dept: NEUROLOGY | Facility: CLINIC | Age: 58
End: 2023-10-06

## 2023-10-06 RX ORDER — TRAZODONE HYDROCHLORIDE 50 MG/1
100 TABLET, FILM COATED ORAL AT BEDTIME
Qty: 60 TABLET | Refills: 0 | Status: SHIPPED | OUTPATIENT
Start: 2023-10-06 | End: 2023-12-11

## 2023-10-06 NOTE — TELEPHONE ENCOUNTER
Patient is due to come in for a follow up. Please call and help make appointment for patient to come in to be seen with Dr. Montero.    Thank you.      MANISH Garcia on 10/6/2023 at 8:01 AM

## 2023-10-06 NOTE — TELEPHONE ENCOUNTER
Refill request for the following medication (s) listed below.    Pending Prescriptions:                       Disp   Refills    traZODone (DESYREL) 50 MG tablet          60 tab*0            Sig: Take 2 tablets (100 mg) by mouth at bedtime      Last office visit provider:  1/16/2023  Next appointment scheduled: None. Pt is due for an appt. Will send a msg to the  to call pt.      Medication T'd for review and signature    MANISH Garcia on 10/6/2023 at 7:52 AM

## 2023-10-10 ENCOUNTER — MYC REFILL (OUTPATIENT)
Dept: NEUROLOGY | Facility: CLINIC | Age: 58
End: 2023-10-10
Payer: COMMERCIAL

## 2023-10-10 DIAGNOSIS — F51.04 CHRONIC INSOMNIA: ICD-10-CM

## 2023-10-10 RX ORDER — TRAZODONE HYDROCHLORIDE 50 MG/1
100 TABLET, FILM COATED ORAL AT BEDTIME
Qty: 60 TABLET | Refills: 0 | Status: CANCELLED | OUTPATIENT
Start: 2023-10-10

## 2023-10-10 NOTE — TELEPHONE ENCOUNTER
Received request for a refill on Trazodone 50 MG. Med was sent to the pharmacy on 10/6/2023.      MANISH Garcia on 10/10/2023 at 12:38 PM

## 2023-11-30 ENCOUNTER — TELEPHONE (OUTPATIENT)
Dept: DERMATOLOGY | Facility: CLINIC | Age: 58
End: 2023-11-30

## 2023-11-30 ENCOUNTER — VIRTUAL VISIT (OUTPATIENT)
Dept: PEDIATRICS | Facility: CLINIC | Age: 58
End: 2023-11-30
Payer: COMMERCIAL

## 2023-11-30 DIAGNOSIS — R21 RASH AND NONSPECIFIC SKIN ERUPTION: Primary | ICD-10-CM

## 2023-11-30 PROCEDURE — 99203 OFFICE O/P NEW LOW 30 MIN: CPT | Mod: VID | Performed by: NURSE PRACTITIONER

## 2023-11-30 RX ORDER — TRIAMCINOLONE ACETONIDE 1 MG/G
CREAM TOPICAL 2 TIMES DAILY PRN
Qty: 80 G | Refills: 0 | Status: SHIPPED | OUTPATIENT
Start: 2023-11-30

## 2023-11-30 NOTE — PATIENT INSTRUCTIONS
"Chief complaint:   Chief Complaint   Patient presents with   â¢ Follow-up     5 month f/u        Vitals:  Visit Vitals  /68 (BP Location: LUE - Left upper extremity, Patient Position: Sitting, Cuff Size: Regular)   Pulse 76   Ht 5' 1.5"" (1.562 m)   Wt 53.1 kg (117 lb)   BMI 21.75 kg/mÂ²       HISTORY OF PRESENT ILLNESS     Radha Daley is a [facility] [place] [address],[place]. [age]year old male with a past medical history of dyslipidemia and aortic valve stenosis. Today patient presents with his son and reports doing well from a cardiac standpoint. Denies any medication changes, ER visits, or hospitalizations since last visit. He tries to walk briskly for 30 minutes every morning. Denies any chest pain or shortness of breath. No limitations with daily activities. He occasionally takes aspirin. He went for a bone infusion last week and was started on calcium. Occasional episodes of dizziness, denies syncope. No further complaints at this time. Echo 09/23/2022  Normal left ventricular chamber size, wall thickness and systolic function with no regional wall motion abnormalities. LV EF 58%. E/e':10. LV Global longitudinal strain -21.2 %. Moderate aortic valve calcification. Mild aortic valve stenosis with peak velocity of 3.3 m/s, mean gradient of 25 mmHg, and aortic  valve area of 1.1 cm2. Aortic valve dimensionless index, (VTIs): 0.29. Mild aortic valve regurgitation. Mild mitral valve regurgitation. Mild tricuspid valve regurgitation. Normal right ventricular systolic pressure 31 mmHg. No significant change since the prior study dated 3/21/2022. Echo 03/21/2022  Normal left ventricular chamber size, wall thickness and systolic function with no regional wall motion abnormalities. LV EF 67 %. Grade I left ventricular diastolic dysfunction. LV Global longitudinal strain -18.6 %. Normal right ventricular systolic function. Normal right ventricular systolic pressure 26 mmHg. Aortic valve sclerosis.   Moderate aortic valve stenosis " [facility]. I will have my staff call over there and see what their soonest available is.    with peak velocity of 3.8 m/s, mean gradient of 32 mmHg, and aortic valve area of 1.1 cm2. Aortic valve dimensionless index, (VTIs): 0.29. Mild aortic valve regurgitation. Mild mitral valve regurgitation. Mild tricuspid valve regurgitation. Echo 03/11/2021  Focused TTE for Re-Evaluation of Aortic Stenosis and Phantom Bike. Moderate aortic valve stenosis; mean gradient 30 mmHg, LUCINDA 1.1 cm2. Peak Velocity = 3.7 m/sec. Dimensionless Index = 0.28. With phantom bicycle and Post-PVC, no significant change in aortic valve peak velocity, mean gradients. Compared to prior study, LVOT VTI has increased. This accounts for difference in LUCINDA between 9/29/2020 and today's study. Â   Echo 09/29/2020 (LVEF 60%)  Technically difficult study. Left ventricular ejection fraction, 60 %. Grade I/IV diastolic dysfunction (abnormal relaxation filling pattern), normal to mildly elevated filling pressures. Normal right ventricular size and systolic function. Severe calcific aortic valve stenosis, peak velocity 3.7 m/s, mean gradient 27.3 mmHg, LUCINDA 0.74 cmÂ². Dimensionless index 0.24. Moderate aortic valve regurgitation. No pericardial effusion. Â   Echo 08/27/2019  Moderate basal anteroseptal hypertrophy. Normal LV systolic function. Grade I/IV LV diastolic dysfunction. Moderate aortic valve stenosis. Mild aortic valve regurgitation. Other significant problems:  Patient Active Problem List    Diagnosis Date Noted   â¢ Osteoporosis, senile 07/16/2021     Priority: Low   â¢ Age-related osteoporosis with current pathological fracture 05/18/2021     Priority: Low   â¢ Back pain 08/28/2020     Priority: Low   â¢ Aortic stenosis, moderate 08/13/2018     Priority: Low     Echo 8/27/2019  Moderate basal anteroseptal hypertrophy. Normal LV systolic function. Grade I/IV LV diastolic dysfunction. Moderate aortic valve stenosis. Mild aortic valve regurgitation.      â¢ History of inguinal herniorrhaphy      Priority: Low     Remote, believes left-sided     â¢ History of colonoscopy      Priority: Low     Approximately 2007, normal per patient     â¢ Subdural hematoma 11/10/2011     Priority: Low   â¢ Peripheral vascular disease (CMS/HCC) 11/10/2011     Priority: Low   â¢ Tendonitis of shoulder, right 11/10/2011     Priority: Low   â¢ Dyslipidemia      Priority: Low   â¢ Orbital fracture (CMS/HCC) 11/01/2011     Priority: Low   â¢ Malignant neoplasm of prostate (CMS/HCC) 01/01/1994     Priority: Low     S/p b/l orchiectomy and radiation therapy     â¢ History of orchiectomy, bilateral 01/01/1994     Priority: Low     For prostate cancer     â¢ History of appendectomy 01/01/1957     Priority: Low     Secondary to appendicitis         PAST MEDICAL, FAMILY AND SOCIAL HISTORY     Medications:  Current Outpatient Medications   Medication Sig Dispense Refill   â¢ calcium carbonate-cholecalciferol (Oyster Shell Calcium Plus D) 500-200 MG-UNIT tablet  60 tablet    â¢ Cholecalciferol (Vitamin D) 50 mcg (2,000 units) tablet Take 2 tablets by mouth daily. 90 tablet 0   â¢ sodium fluoride 1.1 % dental paste Use as directed 1 time daily 100 mL 2   â¢ lidocaine (LIDOCARE) 4 % patch Place 1 patch onto the skin every 24 hours. 10 patch 0   â¢ aspirin 81 MG EC tablet Take 1 tablet by mouth daily. 90 tablet 3   â¢ Multiple Vitamins-Minerals (PRESERVISION AREDS 2 PO) Take by mouth daily. â¢ Probiotic Product (PROBIOTIC PO)        No current facility-administered medications for this visit.        Allergies:  ALLERGIES:  No Known Allergies    Past Medical  History/Surgeries:  Past Medical History:   Diagnosis Date   â¢ Dyslipidemia    â¢ Failed moderate sedation during procedure    â¢ Prostate cancer (CMS/HCC)    â¢ Vitamin D deficiency        Past Surgical History:   Procedure Laterality Date   â¢ Appendectomy     â¢ Eye surgery  2015   â¢ Hernia repair  11/14/12    [facility]   â¢ Prostate surgery  1993   â¢ Vein surgery  2009       Family History:  Family History   Problem Relation Age of Onset   â¢ Heart disease Father    â¢ Heart disease Sister    â¢ Coronary Artery Disease Other    â¢ Heart disease Other         no early CHD/CAD in 1st degree relative       Social History:  Social History     Tobacco Use   â¢ Smoking status: Never Smoker   â¢ Smokeless tobacco: Never Used   Substance Use Topics   â¢ Alcohol use: Yes     Alcohol/week: 0.0 standard drinks     Comment: rarely       REVIEW OF SYSTEMS     Review of Systems   Constitutional: Negative. Respiratory: Negative. Cardiovascular: Negative. Gastrointestinal: Negative. Genitourinary: Negative. Musculoskeletal: Negative. Skin: Negative. Neurological: Negative. Hematological: Negative. PHYSICAL EXAM     Physical Exam  Constitutional:       Appearance: Normal appearance. HENT:      Head: Normocephalic and atraumatic. Cardiovascular:      Rate and Rhythm: Normal rate and regular rhythm. Heart sounds: Murmur heard. Systolic murmur is present with a grade of 4/6. Pulmonary:      Effort: Pulmonary effort is normal.      Breath sounds: Normal breath sounds. Skin:     General: Skin is warm and dry. Neurological:      Mental Status: He is alert. Psychiatric:         Mood and Affect: Mood normal.         Behavior: Behavior normal.       ASSESSMENT/PLAN     Moderate aortic valve stenosis: Mean gradient 25 mmHg, LUCINDA 1.1 cm2 per echo 09/2022, which is stable from prior study in 03/2022. Denies any shortness of breath. Continue asa 81 mg daily. Will continue to monitor with limited echo in April. Â   Dyslipidemia: , Chol 206 as of 10/2020. Not on statin therapy. Encouraged to maintain cardiac diet and regular exercise. Return in about 6 months (around 5/28/2023).     On 11/28/2022, [facility] Constance scribed the services personally performed by Fiordaliza Catalan MD    The documentation recorded by the scribe accurately and completely reflects the service(s) I personally performed and the decisions made by me.

## 2023-11-30 NOTE — TELEPHONE ENCOUNTER
Spoke with pt and scheduled sooner appt.    Thank you,  Becky LESTER RN  Dermatology   [phone]

## 2023-11-30 NOTE — PROGRESS NOTES
Linda is a 58 year old who is being evaluated via a billable video visit.      How would you like to obtain your AVS? MyChart  If the video visit is dropped, the invitation should be resent by: Text to cell phone: [phone]  Will anyone else be joining your video visit? No          Assessment & Plan     Rash and nonspecific skin eruption  Will treat with topical steroid to start as this has been effective for her in the past. Would like to hold on oral steroid until we know exactly what kind of rash we are dealing with, will be better assessed with an in-person visit. Will need to see derm asap, emergent referral placed through [place]. Will also have my staff call over the [facility] in [place] to see what their soonest available is.   - Adult Dermatology  Referral; Future  - triamcinolone (KENALOG) 0.1 % external cream; Apply topically 2 times daily as needed for irritation      22 minutes spent by me on the date of the encounter doing chart review, patient visit, and documentation        MEDICATIONS:        - Start taking triamcinolone cream       - Continue other medications without change  CONSULTATION/REFERRAL to Dermatology    RADHA [facility] LANIE    Subjective   Linda is a 58 year old, presenting for the following health issues:  Derm Problem      HPI     Presents today with rash.    History of MS. She follows with neurology every 6 months. Has an upcoming appt next week. She does not have a PCP.     Reports at least a 1-2 month history of generalized rash that has been progressively worsening. It is very itchy and she admits to lots of scratching. The rash is also painful, managing with tylenol and admits to using her son's medicinal marijuana. Rash is worse on elbows and neck. She tells me she has had this rash on and off for years. Has never had a formal diagnosis. It sounds like she did do some form of light therapy in the past but it was too hard for  her to get undressed for this due to her MS. Has been prescribed an ointment by dermatology before, she thinks a steroid ointment, many years ago.      Patient Active Problem List   Diagnosis    Multiple sclerosis (H)     Current Outpatient Medications   Medication    triamcinolone (KENALOG) 0.1 % external cream    amphetamine-dextroamphetamine (ADDERALL) 15 MG tablet    cholecalciferol, vitamin D3, (VITAMIN D3) 5,000 unit Tab    DULoxetine (CYMBALTA) 60 MG capsule    gabapentin (NEURONTIN) 300 MG capsule    glatiramer acetate 40 MG/ML injection    levothyroxine (SYNTHROID, LEVOTHROID) 75 MCG tablet    LORazepam (ATIVAN) 0.5 MG tablet    predniSONE (DELTASONE) 50 MG tablet    SUMAtriptan (IMITREX) 50 MG tablet    tiZANidine (ZANAFLEX) 4 MG tablet    traZODone (DESYREL) 50 MG tablet    triamcinolone (KENALOG) 0.1 % ointment     No current facility-administered medications for this visit.      No Known Allergies    Review of Systems    ROS: 10 point ROS neg other than the symptoms noted above in the HPI.        Objective       Vitals:  No vitals were obtained today due to virtual visit.    Physical Exam   GENERAL: Healthy, alert and no distress  EYES: Eyes grossly normal to inspection.  No discharge or erythema, or obvious scleral/conjunctival abnormalities.  RESP: No audible wheeze, cough, or visible cyanosis.  No visible retractions or increased work of breathing.    SKIN: Red papular lesions and pink patches scattered throughout bilateral upper extremities, neck, bilateral upper thighs, under breasts. No obvious drainage.   NEURO: Cranial nerves grossly intact.  Mentation and speech appropriate for age.  PSYCH: Mentation appears normal, affect normal/bright, judgement and insight intact, normal speech and appearance well-groomed.            Video-Visit Details    Type of service:  Video Visit     Originating Location (pt. Location): Home    Distant Location (provider location):  Off-site  Platform used for Video  Visit: Jon

## 2023-11-30 NOTE — Clinical Note
Brooks Martinez! This patient has a pretty aggressive rash. I placed an emergency referral through [place] but was hoping we could call over to [facility] in [place] to see what their soonest available is as well. Thank you so much.

## 2023-11-30 NOTE — TELEPHONE ENCOUNTER
This encounter is being sent to inform the clinic that this patient has a referral from Rash and nonspecific skin eruption; Rash for the diagnoses of Rolanda Bolton APRN CNP in  IM/PEDS and has requested that this patient be seen within Emergency: 1-2 Days and/or with Emergency: 1-2 Days.  Based on the availability of our provider(s), we are unable to accommodate this request.      Were all sites offered this patient?  Yes  1st choice [facility] in [place]  [place] [facility] [place]  3rd choice  [facility] [place]    Does scheduling algorithm request to schedule next available?  Patient has been scheduled for the first available opening with Fozia Yang PA-C at  [facility] [place] on 02/06/2024.  We have informed the patient that the clinic will review their referral and reach out if a sooner appointment is medically necessary.

## 2023-12-11 ENCOUNTER — OFFICE VISIT (OUTPATIENT)
Dept: NEUROLOGY | Facility: CLINIC | Age: 58
End: 2023-12-11
Payer: COMMERCIAL

## 2023-12-11 ENCOUNTER — MYC REFILL (OUTPATIENT)
Dept: NEUROLOGY | Facility: CLINIC | Age: 58
End: 2023-12-11

## 2023-12-11 ENCOUNTER — LAB (OUTPATIENT)
Dept: LAB | Facility: CLINIC | Age: 58
End: 2023-12-11
Payer: COMMERCIAL

## 2023-12-11 VITALS — HEART RATE: 97 BPM | DIASTOLIC BLOOD PRESSURE: 89 MMHG | SYSTOLIC BLOOD PRESSURE: 118 MMHG

## 2023-12-11 DIAGNOSIS — F51.04 CHRONIC INSOMNIA: ICD-10-CM

## 2023-12-11 DIAGNOSIS — R21 GENERALIZED RASH: ICD-10-CM

## 2023-12-11 DIAGNOSIS — G35 MS (MULTIPLE SCLEROSIS) (H): Primary | ICD-10-CM

## 2023-12-11 DIAGNOSIS — E55.9 VITAMIN D DEFICIENCY: ICD-10-CM

## 2023-12-11 DIAGNOSIS — G35 MS (MULTIPLE SCLEROSIS) (H): ICD-10-CM

## 2023-12-11 DIAGNOSIS — G81.91 RIGHT HEMIPLEGIA (H): ICD-10-CM

## 2023-12-11 DIAGNOSIS — F41.9 ANXIETY: ICD-10-CM

## 2023-12-11 DIAGNOSIS — R63.4 WEIGHT LOSS: ICD-10-CM

## 2023-12-11 DIAGNOSIS — E03.9 HYPOTHYROIDISM, UNSPECIFIED TYPE: ICD-10-CM

## 2023-12-11 DIAGNOSIS — G82.50 SPASTIC QUADRIPARESIS (H): ICD-10-CM

## 2023-12-11 DIAGNOSIS — G35 MULTIPLE SCLEROSIS (H): ICD-10-CM

## 2023-12-11 LAB
ALBUMIN SERPL BCG-MCNC: 4.5 G/DL (ref 3.5–5.2)
ALP SERPL-CCNC: 59 U/L (ref 40–150)
ALT SERPL W P-5'-P-CCNC: 13 U/L (ref 0–50)
ANION GAP SERPL CALCULATED.3IONS-SCNC: 11 MMOL/L (ref 7–15)
AST SERPL W P-5'-P-CCNC: 23 U/L (ref 0–45)
BASOPHILS # BLD AUTO: 0 10E3/UL (ref 0–0.2)
BASOPHILS NFR BLD AUTO: 0 %
BILIRUB SERPL-MCNC: 0.4 MG/DL
BUN SERPL-MCNC: 7.8 MG/DL (ref 6–20)
CALCIUM SERPL-MCNC: 10.5 MG/DL (ref 8.6–10)
CHLORIDE SERPL-SCNC: 104 MMOL/L (ref 98–107)
CREAT SERPL-MCNC: 0.74 MG/DL (ref 0.51–0.95)
CRP SERPL-MCNC: <3 MG/L
DEPRECATED HCO3 PLAS-SCNC: 30 MMOL/L (ref 22–29)
EGFRCR SERPLBLD CKD-EPI 2021: >90 ML/MIN/1.73M2
EOSINOPHIL # BLD AUTO: 0.2 10E3/UL (ref 0–0.7)
EOSINOPHIL NFR BLD AUTO: 2 %
ERYTHROCYTE [DISTWIDTH] IN BLOOD BY AUTOMATED COUNT: 14.4 % (ref 10–15)
ERYTHROCYTE [SEDIMENTATION RATE] IN BLOOD BY WESTERGREN METHOD: 11 MM/HR (ref 0–30)
FOLATE SERPL-MCNC: 6.9 NG/ML (ref 4.6–34.8)
GLUCOSE SERPL-MCNC: 115 MG/DL (ref 70–99)
HCT VFR BLD AUTO: 46.2 % (ref 35–47)
HGB BLD-MCNC: 15 G/DL (ref 11.7–15.7)
IMM GRANULOCYTES # BLD: 0 10E3/UL
IMM GRANULOCYTES NFR BLD: 0 %
IRON BINDING CAPACITY (ROCHE): 240 UG/DL (ref 240–430)
IRON SATN MFR SERPL: 41 % (ref 15–46)
IRON SERPL-MCNC: 99 UG/DL (ref 37–145)
LYMPHOCYTES # BLD AUTO: 1.2 10E3/UL (ref 0.8–5.3)
LYMPHOCYTES NFR BLD AUTO: 12 %
MCH RBC QN AUTO: 33.3 PG (ref 26.5–33)
MCHC RBC AUTO-ENTMCNC: 32.5 G/DL (ref 31.5–36.5)
MCV RBC AUTO: 102 FL (ref 78–100)
MONOCYTES # BLD AUTO: 0.5 10E3/UL (ref 0–1.3)
MONOCYTES NFR BLD AUTO: 5 %
NEUTROPHILS # BLD AUTO: 8.1 10E3/UL (ref 1.6–8.3)
NEUTROPHILS NFR BLD AUTO: 81 %
NRBC # BLD AUTO: 0 10E3/UL
NRBC BLD AUTO-RTO: 0 /100
PLATELET # BLD AUTO: 269 10E3/UL (ref 150–450)
POTASSIUM SERPL-SCNC: 4.1 MMOL/L (ref 3.4–5.3)
PROT SERPL-MCNC: 7 G/DL (ref 6.4–8.3)
RBC # BLD AUTO: 4.51 10E6/UL (ref 3.8–5.2)
RETICS # AUTO: 0.03 10E6/UL (ref 0.01–0.11)
RETICS/RBC NFR AUTO: 0.7 % (ref 0.8–2.7)
SODIUM SERPL-SCNC: 145 MMOL/L (ref 135–145)
T4 FREE SERPL-MCNC: 0.79 NG/DL (ref 0.9–1.7)
TOTAL PROTEIN SERUM FOR ELP: 6.7 G/DL (ref 6.4–8.3)
TSH SERPL DL<=0.005 MIU/L-ACNC: 8.67 UIU/ML (ref 0.3–4.2)
VIT B12 SERPL-MCNC: 930 PG/ML (ref 232–1245)
VIT D+METAB SERPL-MCNC: 56 NG/ML (ref 20–50)
WBC # BLD AUTO: 10.1 10E3/UL (ref 4–11)

## 2023-12-11 PROCEDURE — 82607 VITAMIN B-12: CPT

## 2023-12-11 PROCEDURE — 84155 ASSAY OF PROTEIN SERUM: CPT

## 2023-12-11 PROCEDURE — 84443 ASSAY THYROID STIM HORMONE: CPT

## 2023-12-11 PROCEDURE — 86235 NUCLEAR ANTIGEN ANTIBODY: CPT

## 2023-12-11 PROCEDURE — 82746 ASSAY OF FOLIC ACID SERUM: CPT

## 2023-12-11 PROCEDURE — 84439 ASSAY OF FREE THYROXINE: CPT

## 2023-12-11 PROCEDURE — 36415 COLL VENOUS BLD VENIPUNCTURE: CPT

## 2023-12-11 PROCEDURE — 86140 C-REACTIVE PROTEIN: CPT

## 2023-12-11 PROCEDURE — 86334 IMMUNOFIX E-PHORESIS SERUM: CPT | Performed by: PATHOLOGY

## 2023-12-11 PROCEDURE — 85652 RBC SED RATE AUTOMATED: CPT

## 2023-12-11 PROCEDURE — 80053 COMPREHEN METABOLIC PANEL: CPT

## 2023-12-11 PROCEDURE — 84165 PROTEIN E-PHORESIS SERUM: CPT | Mod: TC | Performed by: PATHOLOGY

## 2023-12-11 PROCEDURE — 85045 AUTOMATED RETICULOCYTE COUNT: CPT

## 2023-12-11 PROCEDURE — 85004 AUTOMATED DIFF WBC COUNT: CPT

## 2023-12-11 PROCEDURE — 86038 ANTINUCLEAR ANTIBODIES: CPT

## 2023-12-11 PROCEDURE — 82306 VITAMIN D 25 HYDROXY: CPT

## 2023-12-11 PROCEDURE — 99215 OFFICE O/P EST HI 40 MIN: CPT | Performed by: PSYCHIATRY & NEUROLOGY

## 2023-12-11 PROCEDURE — 86225 DNA ANTIBODY NATIVE: CPT

## 2023-12-11 PROCEDURE — 83550 IRON BINDING TEST: CPT

## 2023-12-11 NOTE — LETTER
12/11/2023         RE: Chelsi Robertson  [address]  [facility] [address]        Dear Colleague,    Thank you for referring your patient, Chelsi Robertson, to the [facility]. Please see a copy of my visit note below.    Date of Service: 12/11/2023    [facility] Neurology   MS Clinic Follow-up     Subjective: 58-year-old woman who presents in follow-up for multiple sclerosis.    There have been a number of events since her last visit with me     She had issues with insurance, thus follow up was delayed    She developed a severe pruritic rash.  Primary care prescribed a topical steroid, which has provided some relief.  Rash has improved considerably.      She is generally less active. She had a significant fall in may.  Hit her head. Presented to ER and found to have a subdural hematoma. Exact circumstances of the fall were not clear.  She was taking care of her granddaughter at the time and she states that there were toys on the ground.  She might have turned around and lost her balance.      She does not report any discrete new symptoms.     Falls remain her primary problem.  Most falls are quite minor.  Spoke with  who stated that biggest risk is when she goes to the bathroom (commode next to her chair).  Doesn't always use walker. At risk of legs giving out. Also likes to keep tv on. Notes that this is a distraction to her.       does not have any other major concerns.     No Known Allergies    Current Outpatient Medications   Medication     amphetamine-dextroamphetamine (ADDERALL) 15 MG tablet     DULoxetine (CYMBALTA) 60 MG capsule     gabapentin (NEURONTIN) 300 MG capsule     glatiramer acetate 40 MG/ML injection     levothyroxine (SYNTHROID, LEVOTHROID) 75 MCG tablet     LORazepam (ATIVAN) 0.5 MG tablet     SUMAtriptan (IMITREX) 50 MG tablet     tiZANidine (ZANAFLEX) 4 MG tablet     traZODone (DESYREL) 50 MG tablet     triamcinolone  "(KENALOG) 0.1 % external cream     cholecalciferol, vitamin D3, (VITAMIN D3) 5,000 unit Tab     predniSONE (DELTASONE) 50 MG tablet     triamcinolone (KENALOG) 0.1 % ointment     No current facility-administered medications for this visit.        Past medical, surgical, social and family history was personally reviewed. Pertinent details noted above.     Physical Examination:   /89 (BP Location: Right arm, Patient Position: Sitting, Cuff Size: Adult Regular)   Pulse 97     General: no acute distress    Tests/Imaging:   MRI brain 2010 - multiple CNS lesions c/w ms, no enhancing brain lesions  6/2011 - 2 new lesions, 1 melinda+  1/2017 - no new lesions, gd-, some progression of atrophy  7/2020 - no new lesions, gd-    MRI cervical spine 2010 - lesions at C2-3 and C3 w/o enhancement  1/2017 - no new lesions, gd-, some spondylosis  7/2020 - no new lesions, gd-    MRI thoracic spine 2010 - no thoracic cord lesions  7/2020 - no new lesions, gd-    DEXA 1/2019 - osteopenia    NP eval 4/15/19  Impaired problem-solving and complex visual attention  Subtle impairment in \"retrieval based naming\"  Relative impairment of executive functioning with variable auditory and visual attention    Assessment: 58-year-old woman with secondary progressive multiple sclerosis.    Time was spent educating on fall prevention.  We discussed why a simple thing like leaving the tv on can be a fall risk for her.  She was encouraged to use her walker at all times.  We discussed how one fall can result in inability to return to walking.     She should continue with her current medications.     I am concerned about the rash and have advised a work-up.     Plan:   -Continue with tizanidine for management of muscle spasticity  - Continue Adderall for MS related fatigue and cognitive changes  - Trazodone for chronic insomnia  - Continue glatiramer  - advised blood work to look for autoimmune or hematologic cause of rash  - Follow-up in 4 " months    Note was completed with the assistance of Dragon Fluency software which can often result in accidental word substitutions.     A total of 40 minutes on the date of service were spent in the care of this patient.   Gale Montero MD on 12/11/2023 at 11:35 AM              Again, thank you for allowing me to participate in the care of your patient.        Sincerely,        Gale Montero MD

## 2023-12-11 NOTE — PATIENT INSTRUCTIONS
I am glad that you came in today     We discussed how you should turn off/pause the tv when you get up to go to the bathroom   This will help prevent falls     Continue using your walker around the home     Blood work today   I am concerned about the rash -- it could be autoimmune or a sign of a blood condition     Follow up with me in 4 months

## 2023-12-11 NOTE — TELEPHONE ENCOUNTER
Refill request for the following medication (s) listed below.    Pending Prescriptions:                       Disp   Refills    DULoxetine (CYMBALTA) 60 MG capsule       30 cap*3            Sig: Take 1 capsule (60 mg) by mouth every evening    tiZANidine (ZANAFLEX) 4 MG tablet         60 tab*11           Sig: Take 2 tablets (8 mg) by mouth at bedtime    amphetamine-dextroamphetamine (ADDERALL) *120 ta*0            Sig: Take 2 tablets (30 mg) by mouth 2 times daily    LORazepam (ATIVAN) 0.5 MG tablet          30 tab*0            Sig: Take 1 tablet (0.5 mg) by mouth nightly as needed           for anxiety    traZODone (DESYREL) 50 MG tablet          60 tab*3            Sig: Take 2 tablets (100 mg) by mouth at bedtime      Last office visit provider:  12/11/2023  Next appointment scheduled: 4/11/2024      Medication T'd for review and signature

## 2023-12-12 LAB
ALBUMIN SERPL ELPH-MCNC: 4.2 G/DL (ref 3.7–5.1)
ALPHA1 GLOB SERPL ELPH-MCNC: 0.4 G/DL (ref 0.2–0.4)
ALPHA2 GLOB SERPL ELPH-MCNC: 0.7 G/DL (ref 0.5–0.9)
ANA PAT SER IF-IMP: ABNORMAL
ANA SER QL IF: POSITIVE
ANA TITR SER IF: ABNORMAL {TITER}
B-GLOBULIN SERPL ELPH-MCNC: 0.8 G/DL (ref 0.6–1)
GAMMA GLOB SERPL ELPH-MCNC: 0.7 G/DL (ref 0.7–1.6)
M PROTEIN SERPL ELPH-MCNC: 0 G/DL
PATH REPORT.COMMENTS IMP SPEC: NORMAL
PATH REPORT.COMMENTS IMP SPEC: NORMAL
PATH REPORT.FINAL DX SPEC: NORMAL
PATH REPORT.RELEVANT HX SPEC: NORMAL
PROT PATTERN SERPL ELPH-IMP: NORMAL
PROT PATTERN SERPL IFE-IMP: NORMAL

## 2023-12-12 PROCEDURE — 86334 IMMUNOFIX E-PHORESIS SERUM: CPT | Mod: 26 | Performed by: PATHOLOGY

## 2023-12-12 PROCEDURE — 84165 PROTEIN E-PHORESIS SERUM: CPT | Mod: 26 | Performed by: PATHOLOGY

## 2023-12-12 PROCEDURE — 99207 BLOOD MORPHOLOGY PATHOLOGIST REVIEW: CPT | Performed by: PATHOLOGY

## 2023-12-12 RX ORDER — DULOXETIN HYDROCHLORIDE 60 MG/1
60 CAPSULE, DELAYED RELEASE ORAL EVERY EVENING
Qty: 30 CAPSULE | Refills: 3 | Status: SHIPPED | OUTPATIENT
Start: 2023-12-12 | End: 2024-05-13

## 2023-12-12 RX ORDER — DEXTROAMPHETAMINE SACCHARATE, AMPHETAMINE ASPARTATE, DEXTROAMPHETAMINE SULFATE AND AMPHETAMINE SULFATE 3.75; 3.75; 3.75; 3.75 MG/1; MG/1; MG/1; MG/1
30 TABLET ORAL 2 TIMES DAILY
Qty: 120 TABLET | Refills: 0 | Status: SHIPPED | OUTPATIENT
Start: 2023-12-12 | End: 2023-12-16

## 2023-12-12 RX ORDER — LORAZEPAM 0.5 MG/1
0.5 TABLET ORAL
Qty: 30 TABLET | Refills: 0 | Status: SHIPPED | OUTPATIENT
Start: 2023-12-12 | End: 2024-04-10

## 2023-12-12 RX ORDER — TRAZODONE HYDROCHLORIDE 50 MG/1
100 TABLET, FILM COATED ORAL AT BEDTIME
Qty: 60 TABLET | Refills: 3 | Status: SHIPPED | OUTPATIENT
Start: 2023-12-12 | End: 2024-05-10

## 2023-12-13 LAB
DSDNA AB SER-ACNC: 1 IU/ML
ENA SM IGG SER IA-ACNC: 0.9 U/ML
ENA SM IGG SER IA-ACNC: NEGATIVE
ENA SS-A AB SER IA-ACNC: <0.5 U/ML
ENA SS-A AB SER IA-ACNC: NEGATIVE
ENA SS-B IGG SER IA-ACNC: <0.6 U/ML
ENA SS-B IGG SER IA-ACNC: NEGATIVE
U1 SNRNP IGG SER IA-ACNC: 1.6 U/ML
U1 SNRNP IGG SER IA-ACNC: NEGATIVE

## 2023-12-16 ENCOUNTER — MYC REFILL (OUTPATIENT)
Dept: NEUROLOGY | Facility: CLINIC | Age: 58
End: 2023-12-16
Payer: COMMERCIAL

## 2023-12-16 DIAGNOSIS — G35 MS (MULTIPLE SCLEROSIS) (H): ICD-10-CM

## 2023-12-18 ENCOUNTER — E-VISIT (OUTPATIENT)
Dept: PEDIATRICS | Facility: CLINIC | Age: 58
End: 2023-12-18
Payer: COMMERCIAL

## 2023-12-18 DIAGNOSIS — Z83.3 FAMILY HISTORY OF DIABETES MELLITUS: ICD-10-CM

## 2023-12-18 DIAGNOSIS — R73.09 ELEVATED GLUCOSE: Primary | ICD-10-CM

## 2023-12-18 PROCEDURE — 99421 OL DIG E/M SVC 5-10 MIN: CPT | Performed by: NURSE PRACTITIONER

## 2023-12-18 RX ORDER — DEXTROAMPHETAMINE SACCHARATE, AMPHETAMINE ASPARTATE, DEXTROAMPHETAMINE SULFATE AND AMPHETAMINE SULFATE 3.75; 3.75; 3.75; 3.75 MG/1; MG/1; MG/1; MG/1
30 TABLET ORAL 2 TIMES DAILY
Qty: 120 TABLET | Refills: 0 | Status: SHIPPED | OUTPATIENT
Start: 2023-12-18 | End: 2024-04-10

## 2023-12-18 NOTE — TELEPHONE ENCOUNTER
Refill request for the following medication (s) listed below.    Pending Prescriptions:                       Disp   Refills    amphetamine-dextroamphetamine (ADDERALL) *120 ta*0            Sig: Take 2 tablets (30 mg) by mouth 2 times daily      Last office visit provider:  12/11/2023  Next appointment scheduled: 4/11/2024      Medication T'd for review and signature    MANISH Garcia on 12/18/2023 at 10:27 AM

## 2023-12-18 NOTE — PATIENT INSTRUCTIONS
Thank you for choosing us for your care. Given your symptoms, I would like you to do a lab-only visit to determine what is causing them.  I have placed the orders.  Please schedule an appointment with the lab right here in [place], or call [phone].  I will let you know when the results are back and next steps to take.

## 2023-12-19 ENCOUNTER — LAB (OUTPATIENT)
Dept: LAB | Facility: CLINIC | Age: 58
End: 2023-12-19
Payer: COMMERCIAL

## 2023-12-19 ENCOUNTER — MYC MEDICAL ADVICE (OUTPATIENT)
Dept: PEDIATRICS | Facility: CLINIC | Age: 58
End: 2023-12-19

## 2023-12-19 DIAGNOSIS — R73.09 ELEVATED GLUCOSE: ICD-10-CM

## 2023-12-19 DIAGNOSIS — Z83.3 FAMILY HISTORY OF DIABETES MELLITUS: ICD-10-CM

## 2023-12-19 PROBLEM — R73.03 PREDIABETES: Status: ACTIVE | Noted: 2023-12-19

## 2023-12-19 LAB — HBA1C MFR BLD: 5.7 %

## 2023-12-19 PROCEDURE — 83036 HEMOGLOBIN GLYCOSYLATED A1C: CPT

## 2023-12-19 PROCEDURE — 36415 COLL VENOUS BLD VENIPUNCTURE: CPT

## 2023-12-29 ENCOUNTER — LAB REQUISITION (OUTPATIENT)
Dept: LAB | Facility: CLINIC | Age: 58
End: 2023-12-29

## 2023-12-29 DIAGNOSIS — E03.9 HYPOTHYROIDISM, UNSPECIFIED: ICD-10-CM

## 2023-12-29 DIAGNOSIS — Z13.1 ENCOUNTER FOR SCREENING FOR DIABETES MELLITUS: ICD-10-CM

## 2023-12-29 LAB
ALBUMIN SERPL BCG-MCNC: 4.1 G/DL (ref 3.5–5.2)
ALP SERPL-CCNC: 51 U/L (ref 40–150)
ALT SERPL W P-5'-P-CCNC: 8 U/L (ref 0–50)
ANION GAP SERPL CALCULATED.3IONS-SCNC: 12 MMOL/L (ref 7–15)
AST SERPL W P-5'-P-CCNC: 17 U/L (ref 0–45)
BILIRUB SERPL-MCNC: 0.4 MG/DL
BUN SERPL-MCNC: 9.4 MG/DL (ref 6–20)
CALCIUM SERPL-MCNC: 9.5 MG/DL (ref 8.6–10)
CHLORIDE SERPL-SCNC: 102 MMOL/L (ref 98–107)
CREAT SERPL-MCNC: 0.75 MG/DL (ref 0.51–0.95)
DEPRECATED HCO3 PLAS-SCNC: 29 MMOL/L (ref 22–29)
EGFRCR SERPLBLD CKD-EPI 2021: >90 ML/MIN/1.73M2
GLUCOSE SERPL-MCNC: 108 MG/DL (ref 70–99)
POTASSIUM SERPL-SCNC: 3.9 MMOL/L (ref 3.4–5.3)
PROT SERPL-MCNC: 6.7 G/DL (ref 6.4–8.3)
SODIUM SERPL-SCNC: 143 MMOL/L (ref 135–145)
TSH SERPL DL<=0.005 MIU/L-ACNC: 8.19 UIU/ML (ref 0.3–4.2)

## 2023-12-29 PROCEDURE — 80053 COMPREHEN METABOLIC PANEL: CPT

## 2023-12-29 PROCEDURE — 84443 ASSAY THYROID STIM HORMONE: CPT

## 2024-01-03 ENCOUNTER — TRANSFERRED RECORDS (OUTPATIENT)
Dept: HEALTH INFORMATION MANAGEMENT | Facility: CLINIC | Age: 59
End: 2024-01-03

## 2024-01-03 DIAGNOSIS — M79.2 NEUROPATHIC PAIN: ICD-10-CM

## 2024-01-03 RX ORDER — GABAPENTIN 300 MG/1
900 CAPSULE ORAL AT BEDTIME
Qty: 270 CAPSULE | Refills: 3 | Status: SHIPPED | OUTPATIENT
Start: 2024-01-03

## 2024-01-03 NOTE — TELEPHONE ENCOUNTER
Refill request for the following medication (s) listed below.    Pending Prescriptions:                       Disp   Refills    gabapentin (NEURONTIN) 300 MG capsule     270 ca*3            Sig: Take 3 capsules (900 mg) by mouth at bedtime      Last office visit provider:  12/11/23  Next appointment scheduled: None      Medication T'd for review and signature    MANISH Garcia on 1/3/2024 at 10:58 AM

## 2024-04-10 ENCOUNTER — MYC REFILL (OUTPATIENT)
Dept: NEUROLOGY | Facility: CLINIC | Age: 59
End: 2024-04-10
Payer: COMMERCIAL

## 2024-04-10 DIAGNOSIS — F41.9 ANXIETY: ICD-10-CM

## 2024-04-10 DIAGNOSIS — G35 MS (MULTIPLE SCLEROSIS) (H): ICD-10-CM

## 2024-04-10 RX ORDER — DEXTROAMPHETAMINE SACCHARATE, AMPHETAMINE ASPARTATE, DEXTROAMPHETAMINE SULFATE AND AMPHETAMINE SULFATE 3.75; 3.75; 3.75; 3.75 MG/1; MG/1; MG/1; MG/1
30 TABLET ORAL 2 TIMES DAILY
Qty: 120 TABLET | Refills: 0 | Status: SHIPPED | OUTPATIENT
Start: 2024-04-10 | End: 2024-07-08

## 2024-04-10 RX ORDER — LORAZEPAM 0.5 MG/1
0.5 TABLET ORAL
Qty: 30 TABLET | Refills: 0 | Status: SHIPPED | OUTPATIENT
Start: 2024-04-10 | End: 2024-07-08

## 2024-04-10 NOTE — TELEPHONE ENCOUNTER
Patient requesting refill of their lorazepam; Patient was last seen in Dec 2023 and has follow up appointment 4/22/24 with Dr Montero. Pended refill request to Dr Montero.    Francesca Moncada RN

## 2024-04-10 NOTE — TELEPHONE ENCOUNTER
Patient requesting refill of their Adderall; Patient was last seen in Dec 2023 and has follow up appointment in April 2024 with Dr Montero. Pended rx to Dr Montero for signature and will send electronically to the pharmacy once signed.    Francesca Moncada RN

## 2024-04-22 ENCOUNTER — OFFICE VISIT (OUTPATIENT)
Dept: NEUROLOGY | Facility: CLINIC | Age: 59
End: 2024-04-22
Payer: COMMERCIAL

## 2024-04-22 VITALS — DIASTOLIC BLOOD PRESSURE: 73 MMHG | SYSTOLIC BLOOD PRESSURE: 102 MMHG | HEART RATE: 112 BPM

## 2024-04-22 DIAGNOSIS — G35 MS (MULTIPLE SCLEROSIS) (H): Primary | ICD-10-CM

## 2024-04-22 DIAGNOSIS — G82.50 SPASTIC QUADRIPARESIS (H): ICD-10-CM

## 2024-04-22 DIAGNOSIS — F51.04 CHRONIC INSOMNIA: ICD-10-CM

## 2024-04-22 PROCEDURE — 99214 OFFICE O/P EST MOD 30 MIN: CPT | Performed by: PSYCHIATRY & NEUROLOGY

## 2024-04-22 NOTE — PROGRESS NOTES
Date of Service: 4/22/2024    [facility] Neurology   MS Clinic Follow-up     Subjective: 59-year-old woman who presents in follow-up for multiple sclerosis.    She is accompanied by her son today.     Recall that at her last visit she was struggling with a severe rash.  She did see dermatology, but does not recall getting a clear answer. Rash has generally improved, but not resolved.     She falls every couple months. Generally loses her balance with turns. She recalls having a fall on jan 27 and again last week.  She has been more cautious.  is very concerned about her safety.     Vision is foggy in the right eye. Has a cataract.     Notes that her right wrist drop has gotten worse over time. She requires assistance with daily activities such as doing her hair. She no longer wears make up.     Estimates that she could walk 1/2 block.     No Known Allergies    Current Outpatient Medications   Medication Sig Dispense Refill    amphetamine-dextroamphetamine (ADDERALL) 15 MG tablet Take 2 tablets (30 mg) by mouth 2 times daily 120 tablet 0    DULoxetine (CYMBALTA) 60 MG capsule Take 1 capsule (60 mg) by mouth every evening 30 capsule 3    gabapentin (NEURONTIN) 300 MG capsule Take 3 capsules (900 mg) by mouth at bedtime 270 capsule 3    glatiramer acetate 40 MG/ML injection Inject 40 mg Subcutaneous three times a week 12 mL 11    levothyroxine (SYNTHROID, LEVOTHROID) 75 MCG tablet [LEVOTHYROXINE (SYNTHROID, LEVOTHROID) 75 MCG TABLET] Take 75 mcg by mouth daily.      LORazepam (ATIVAN) 0.5 MG tablet Take 1 tablet (0.5 mg) by mouth nightly as needed for anxiety 30 tablet 0    SUMAtriptan (IMITREX) 50 MG tablet [SUMATRIPTAN (IMITREX) 50 MG TABLET] Take 50 mg by mouth 2 (two) times a day as needed for migraine.      tiZANidine (ZANAFLEX) 4 MG tablet Take 2 tablets (8 mg) by mouth at bedtime 60 tablet 11    traZODone (DESYREL) 50 MG tablet Take 2 tablets (100 mg) by mouth at bedtime 60 tablet 3    triamcinolone  (KENALOG) 0.1 % external cream Apply topically 2 times daily as needed for irritation 80 g 0    triamcinolone (KENALOG) 0.1 % ointment [TRIAMCINOLONE (KENALOG) 0.1 % OINTMENT] Apply 1 application topically 2 (two) times a day as needed.      cholecalciferol, vitamin D3, (VITAMIN D3) 5,000 unit Tab [CHOLECALCIFEROL, VITAMIN D3, (VITAMIN D3) 5,000 UNIT TAB] Take 10,000 Units by mouth every evening.  (Patient not taking: Reported on 6/2/2022)      predniSONE (DELTASONE) 50 MG tablet TAKE 25 TABLETS BY MOUTH FOR 1 DOSE (Patient not taking: Reported on 12/11/2023)       No current facility-administered medications for this visit.        Past medical, surgical, social and family history was personally reviewed. Pertinent details noted above.     Physical Examination:   /73 (BP Location: Right arm, Patient Position: Sitting, Cuff Size: Adult Regular)   Pulse 112     General: no acute distress  ranial nerves:   VFFC  EOM full w/no ABIGAIL   Face symmetric  Hearing intact  No dysarthria   Motor:   Tone is increased in the right > left lower extremities  Bulk is reduced in the right hand                          R          L  Deltoid             5-         5  Biceps             5-         5  Triceps            4+        5  Wrist ext          2 4+  Finger ext        1 4+  Finger abd       1 4+     Hip flexion       1 4  Knee flexion    4          5-  Knee ext 5-         5  Ankle d/f          2 4+     Reflexes: increased on left side, reduced on the right side  Sensory: vibration is mildly reduced in the ankles  Romberg is not assessed  Coordination: sensory ataxia right upper extremity   Gait: deferred    Tests/Imaging:   MRI brain 2010 - multiple CNS lesions c/w ms, no enhancing brain lesions  6/2011 - 2 new lesions, 1 melinda+  1/2017 - no new lesions, gd-, some progression of atrophy  7/2020 - no new lesions, gd-    MRI cervical spine 2010 - lesions at C2-3 and C3 w/o enhancement  1/2017 - no new lesions, gd-, some  "spondylosis  7/2020 - no new lesions, gd-    MRI thoracic spine 2010 - no thoracic cord lesions  7/2020 - no new lesions, gd-    DEXA 1/2019 - osteopenia    NP eval 4/15/19  Impaired problem-solving and complex visual attention  Subtle impairment in \"retrieval based naming\"  Relative impairment of executive functioning with variable auditory and visual attention    Assessment: 59-year-old woman with secondary progressive multiple sclerosis.    She appears to be clinically stable based on examination today.  I do advise radiologic surveillance in 6 months.     She will continue with her current medications for management of spasticity, pain and insomnia.     I have requested her records from dermatology.     We discussed how her history of steroid use predisposes her to developing cataracts.     Plan:   -Continue with tizanidine for spasticity  - Continue Adderall for MS related fatigue and cognitive changes  - Trazodone for chronic insomnia  - Continue glatiramer  - MRI in 6 months   Follow up after MRI     Note was completed with the assistance of Dragon Fluency software which can often result in accidental word substitutions.     A total of 30 minutes on the date of service were spent in the care of this patient.   Gale Montero MD on 4/22/2024 at 11:20 AM            "

## 2024-04-22 NOTE — PATIENT INSTRUCTIONS
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

## 2024-04-22 NOTE — LETTER
4/22/2024         RE: Chelsi Robertson  [address] [facility] [address]        Dear Colleague,    Thank you for referring your patient, Chelsi Robertson, to the [facility]. Please see a copy of my visit note below.    Date of Service: 4/22/2024    [facility] Neurology   MS Clinic Follow-up     Subjective: 59-year-old woman who presents in follow-up for multiple sclerosis.    She is accompanied by her son today.     Recall that at her last visit she was struggling with a severe rash.  She did see dermatology, but does not recall getting a clear answer. Rash has generally improved, but not resolved.     She falls every couple months. Generally loses her balance with turns. She recalls having a fall on jan 27 and again last week.  She has been more cautious.  is very concerned about her safety.     Vision is foggy in the right eye. Has a cataract.     Notes that her right wrist drop has gotten worse over time. She requires assistance with daily activities such as doing her hair. She no longer wears make up.     Estimates that she could walk 1/2 block.     No Known Allergies    Current Outpatient Medications   Medication Sig Dispense Refill     amphetamine-dextroamphetamine (ADDERALL) 15 MG tablet Take 2 tablets (30 mg) by mouth 2 times daily 120 tablet 0     DULoxetine (CYMBALTA) 60 MG capsule Take 1 capsule (60 mg) by mouth every evening 30 capsule 3     gabapentin (NEURONTIN) 300 MG capsule Take 3 capsules (900 mg) by mouth at bedtime 270 capsule 3     glatiramer acetate 40 MG/ML injection Inject 40 mg Subcutaneous three times a week 12 mL 11     levothyroxine (SYNTHROID, LEVOTHROID) 75 MCG tablet [LEVOTHYROXINE (SYNTHROID, LEVOTHROID) 75 MCG TABLET] Take 75 mcg by mouth daily.       LORazepam (ATIVAN) 0.5 MG tablet Take 1 tablet (0.5 mg) by mouth nightly as needed for anxiety 30 tablet 0     SUMAtriptan (IMITREX) 50 MG tablet [SUMATRIPTAN (IMITREX) 50  MG TABLET] Take 50 mg by mouth 2 (two) times a day as needed for migraine.       tiZANidine (ZANAFLEX) 4 MG tablet Take 2 tablets (8 mg) by mouth at bedtime 60 tablet 11     traZODone (DESYREL) 50 MG tablet Take 2 tablets (100 mg) by mouth at bedtime 60 tablet 3     triamcinolone (KENALOG) 0.1 % external cream Apply topically 2 times daily as needed for irritation 80 g 0     triamcinolone (KENALOG) 0.1 % ointment [TRIAMCINOLONE (KENALOG) 0.1 % OINTMENT] Apply 1 application topically 2 (two) times a day as needed.       cholecalciferol, vitamin D3, (VITAMIN D3) 5,000 unit Tab [CHOLECALCIFEROL, VITAMIN D3, (VITAMIN D3) 5,000 UNIT TAB] Take 10,000 Units by mouth every evening.  (Patient not taking: Reported on 6/2/2022)       predniSONE (DELTASONE) 50 MG tablet TAKE 25 TABLETS BY MOUTH FOR 1 DOSE (Patient not taking: Reported on 12/11/2023)       No current facility-administered medications for this visit.        Past medical, surgical, social and family history was personally reviewed. Pertinent details noted above.     Physical Examination:   /73 (BP Location: Right arm, Patient Position: Sitting, Cuff Size: Adult Regular)   Pulse 112     General: no acute distress  ranial nerves:   VFFC  EOM full w/no ABIGAIL   Face symmetric  Hearing intact  No dysarthria   Motor:   Tone is increased in the right > left lower extremities  Bulk is reduced in the right hand                          R          L  Deltoid             5-         5  Biceps             5-         5  Triceps            4+        5  Wrist ext          2 4+  Finger ext        1 4+  Finger abd       1 4+     Hip flexion       1 4  Knee flexion    4          5-  Knee ext 5-         5  Ankle d/f          2 4+     Reflexes: increased on left side, reduced on the right side  Sensory: vibration is mildly reduced in the ankles  Romberg is not assessed  Coordination: sensory ataxia right upper extremity   Gait: deferred    Tests/Imaging:   MRI brain 2010 -  "multiple CNS lesions c/w ms, no enhancing brain lesions  6/2011 - 2 new lesions, 1 melinda+  1/2017 - no new lesions, gd-, some progression of atrophy  7/2020 - no new lesions, gd-    MRI cervical spine 2010 - lesions at C2-3 and C3 w/o enhancement  1/2017 - no new lesions, gd-, some spondylosis  7/2020 - no new lesions, gd-    MRI thoracic spine 2010 - no thoracic cord lesions  7/2020 - no new lesions, gd-    DEXA 1/2019 - osteopenia    NP eval 4/15/19  Impaired problem-solving and complex visual attention  Subtle impairment in \"retrieval based naming\"  Relative impairment of executive functioning with variable auditory and visual attention    Assessment: 59-year-old woman with secondary progressive multiple sclerosis.    She appears to be clinically stable based on examination today.  I do advise radiologic surveillance in 6 months.     She will continue with her current medications for management of spasticity, pain and insomnia.     I have requested her records from dermatology.     We discussed how her history of steroid use predisposes her to developing cataracts.     Plan:   -Continue with tizanidine for spasticity  - Continue Adderall for MS related fatigue and cognitive changes  - Trazodone for chronic insomnia  - Continue glatiramer  - MRI in 6 months   Follow up after MRI     Note was completed with the assistance of Dragon Fluency software which can often result in accidental word substitutions.     A total of 30 minutes on the date of service were spent in the care of this patient.   Gale Montero MD on 4/22/2024 at 11:20 AM              Again, thank you for allowing me to participate in the care of your patient.        Sincerely,        Gale Montero MD  "

## 2024-05-02 ENCOUNTER — MYC MEDICAL ADVICE (OUTPATIENT)
Dept: NEUROLOGY | Facility: CLINIC | Age: 59
End: 2024-05-02
Payer: COMMERCIAL

## 2024-05-10 DIAGNOSIS — F51.04 CHRONIC INSOMNIA: ICD-10-CM

## 2024-05-10 RX ORDER — TRAZODONE HYDROCHLORIDE 50 MG/1
100 TABLET, FILM COATED ORAL AT BEDTIME
Qty: 60 TABLET | Refills: 3 | Status: SHIPPED | OUTPATIENT
Start: 2024-05-10 | End: 2024-07-08

## 2024-05-13 DIAGNOSIS — G35 MULTIPLE SCLEROSIS (H): ICD-10-CM

## 2024-05-13 RX ORDER — DULOXETIN HYDROCHLORIDE 60 MG/1
60 CAPSULE, DELAYED RELEASE ORAL EVERY EVENING
Qty: 30 CAPSULE | Refills: 3 | Status: SHIPPED | OUTPATIENT
Start: 2024-05-13 | End: 2024-09-12

## 2024-05-13 NOTE — TELEPHONE ENCOUNTER
Refill request for the following medication (s) listed below.    Pending Prescriptions:                       Disp   Refills    DULoxetine (CYMBALTA) 60 MG capsule       30 cap*3            Sig: Take 1 capsule (60 mg) by mouth every evening      Last office visit provider:  4/22/2024  Next appointment scheduled: None      Medication T'd for review and signature    MANISH Garcia on 5/13/2024 at 10:00 AM

## 2024-07-08 ENCOUNTER — MYC REFILL (OUTPATIENT)
Dept: NEUROLOGY | Facility: CLINIC | Age: 59
End: 2024-07-08
Payer: COMMERCIAL

## 2024-07-08 DIAGNOSIS — G35 MS (MULTIPLE SCLEROSIS) (H): ICD-10-CM

## 2024-07-08 DIAGNOSIS — F41.9 ANXIETY: ICD-10-CM

## 2024-07-08 DIAGNOSIS — F51.04 CHRONIC INSOMNIA: ICD-10-CM

## 2024-07-10 ENCOUNTER — MYC REFILL (OUTPATIENT)
Dept: NEUROLOGY | Facility: CLINIC | Age: 59
End: 2024-07-10
Payer: COMMERCIAL

## 2024-07-10 ENCOUNTER — TELEPHONE (OUTPATIENT)
Dept: NEUROLOGY | Facility: CLINIC | Age: 59
End: 2024-07-10
Payer: COMMERCIAL

## 2024-07-10 DIAGNOSIS — F41.9 ANXIETY: ICD-10-CM

## 2024-07-10 DIAGNOSIS — F51.04 CHRONIC INSOMNIA: ICD-10-CM

## 2024-07-10 DIAGNOSIS — G35 MS (MULTIPLE SCLEROSIS) (H): ICD-10-CM

## 2024-07-10 RX ORDER — TRAZODONE HYDROCHLORIDE 50 MG/1
100 TABLET, FILM COATED ORAL AT BEDTIME
Qty: 60 TABLET | Refills: 3 | Status: SHIPPED | OUTPATIENT
Start: 2024-07-10

## 2024-07-10 RX ORDER — DEXTROAMPHETAMINE SACCHARATE, AMPHETAMINE ASPARTATE, DEXTROAMPHETAMINE SULFATE AND AMPHETAMINE SULFATE 3.75; 3.75; 3.75; 3.75 MG/1; MG/1; MG/1; MG/1
30 TABLET ORAL 2 TIMES DAILY
Qty: 120 TABLET | Refills: 0 | Status: CANCELLED | OUTPATIENT
Start: 2024-07-10

## 2024-07-10 RX ORDER — LORAZEPAM 0.5 MG/1
0.5 TABLET ORAL
Qty: 30 TABLET | Refills: 0 | Status: CANCELLED | OUTPATIENT
Start: 2024-07-10

## 2024-07-10 RX ORDER — TRAZODONE HYDROCHLORIDE 50 MG/1
100 TABLET, FILM COATED ORAL AT BEDTIME
Qty: 60 TABLET | Refills: 3 | Status: CANCELLED | OUTPATIENT
Start: 2024-07-10

## 2024-07-10 RX ORDER — LORAZEPAM 0.5 MG/1
0.5 TABLET ORAL
Qty: 30 TABLET | Refills: 0 | Status: SHIPPED | OUTPATIENT
Start: 2024-07-10 | End: 2024-08-12

## 2024-07-10 RX ORDER — DEXTROAMPHETAMINE SACCHARATE, AMPHETAMINE ASPARTATE, DEXTROAMPHETAMINE SULFATE AND AMPHETAMINE SULFATE 3.75; 3.75; 3.75; 3.75 MG/1; MG/1; MG/1; MG/1
30 TABLET ORAL 2 TIMES DAILY
Qty: 120 TABLET | Refills: 0 | Status: SHIPPED | OUTPATIENT
Start: 2024-07-10 | End: 2024-08-12

## 2024-07-10 NOTE — TELEPHONE ENCOUNTER
[facility] Call Center    Phone Message    May a detailed message be left on voicemail: yes     Reason for Call: Medication Refill Request    Has the patient contacted the pharmacy for the refill? Yes   Name of medication being requested: LORazepam (ATIVAN) 0.5 MG  amphetamine-dextroamphetamine (ADDERALL) 15 MG tablet   traZODone (DESYREL) 50 MG     Provider who prescribed the medication: Gale Montero MD  Pharmacy: [id number] - [place], [place] - [address]  [phone]    Date medication is needed: Pt is out of medication.  Medication was sent to the wrong Pharmacy     Pt can be reached at: [phone]    Action Taken: Other: Neurology     Travel Screening: Not Applicable     Date of Service:

## 2024-07-11 NOTE — TELEPHONE ENCOUNTER
Prescriptions requested from the pt was sent to [facility] pharmacy yesterday. Will send a MyChart to the pt.      MANISH Garcia on 7/11/2024 at 11:51 AM

## 2024-07-11 NOTE — TELEPHONE ENCOUNTER
MyChart message was sent to the pt letting her know that her refills were sent to [id number] - [place], [place] - [address] yesterday.        MANISH Garcia on 7/11/2024 at 11:54 AM

## 2024-07-20 ENCOUNTER — HEALTH MAINTENANCE LETTER (OUTPATIENT)
Age: 59
End: 2024-07-20

## 2024-08-12 ENCOUNTER — MYC REFILL (OUTPATIENT)
Dept: NEUROLOGY | Facility: CLINIC | Age: 59
End: 2024-08-12
Payer: COMMERCIAL

## 2024-08-12 DIAGNOSIS — F41.9 ANXIETY: ICD-10-CM

## 2024-08-12 DIAGNOSIS — G35 MS (MULTIPLE SCLEROSIS) (H): ICD-10-CM

## 2024-08-12 RX ORDER — DEXTROAMPHETAMINE SACCHARATE, AMPHETAMINE ASPARTATE, DEXTROAMPHETAMINE SULFATE AND AMPHETAMINE SULFATE 3.75; 3.75; 3.75; 3.75 MG/1; MG/1; MG/1; MG/1
30 TABLET ORAL 2 TIMES DAILY
Qty: 120 TABLET | Refills: 0 | Status: SHIPPED | OUTPATIENT
Start: 2024-08-12 | End: 2024-09-12

## 2024-08-12 RX ORDER — LORAZEPAM 0.5 MG/1
0.5 TABLET ORAL
Qty: 30 TABLET | Refills: 0 | Status: SHIPPED | OUTPATIENT
Start: 2024-08-12 | End: 2024-09-17

## 2024-08-12 NOTE — TELEPHONE ENCOUNTER
Refill request for the following medication (s) listed below.    Pending Prescriptions:                       Disp   Refills    LORazepam (ATIVAN) 0.5 MG tablet          30 tab*0            Sig: Take 1 tablet (0.5 mg) by mouth nightly as needed           for anxiety    amphetamine-dextroamphetamine (ADDERALL) *120 ta*0            Sig: Take 2 tablets (30 mg) by mouth 2 times daily      Last office visit provider:  4/22/2024  Next appointment scheduled: None      Medication T'd for review and signature

## 2024-09-05 ENCOUNTER — MYC MEDICAL ADVICE (OUTPATIENT)
Dept: NEUROLOGY | Facility: CLINIC | Age: 59
End: 2024-09-05
Payer: COMMERCIAL

## 2024-09-12 ENCOUNTER — MYC REFILL (OUTPATIENT)
Dept: NEUROLOGY | Facility: CLINIC | Age: 59
End: 2024-09-12
Payer: COMMERCIAL

## 2024-09-12 DIAGNOSIS — G35 MS (MULTIPLE SCLEROSIS) (H): ICD-10-CM

## 2024-09-12 DIAGNOSIS — G35 MULTIPLE SCLEROSIS (H): ICD-10-CM

## 2024-09-13 NOTE — TELEPHONE ENCOUNTER
Refill request for the following medication (s) listed below.    Pending Prescriptions:                       Disp   Refills    DULoxetine (CYMBALTA) 60 MG capsule       30 cap*3            Sig: Take 1 capsule (60 mg) by mouth every evening.    amphetamine-dextroamphetamine (ADDERALL) *120 ta*0            Sig: Take 2 tablets (30 mg) by mouth 2 times daily.      Last office visit provider:  4/22/2024  Next appointment scheduled: None      Medication T'd for review and signature        MANISH Garcia on 9/13/2024 at 12:19 PM

## 2024-09-16 RX ORDER — DULOXETIN HYDROCHLORIDE 60 MG/1
60 CAPSULE, DELAYED RELEASE ORAL EVERY EVENING
Qty: 30 CAPSULE | Refills: 3 | Status: SHIPPED | OUTPATIENT
Start: 2024-09-16

## 2024-09-16 RX ORDER — DEXTROAMPHETAMINE SACCHARATE, AMPHETAMINE ASPARTATE, DEXTROAMPHETAMINE SULFATE AND AMPHETAMINE SULFATE 3.75; 3.75; 3.75; 3.75 MG/1; MG/1; MG/1; MG/1
30 TABLET ORAL 2 TIMES DAILY
Qty: 120 TABLET | Refills: 0 | Status: SHIPPED | OUTPATIENT
Start: 2024-09-16

## 2024-09-17 ENCOUNTER — MYC REFILL (OUTPATIENT)
Dept: NEUROLOGY | Facility: CLINIC | Age: 59
End: 2024-09-17
Payer: COMMERCIAL

## 2024-09-17 DIAGNOSIS — F41.9 ANXIETY: ICD-10-CM

## 2024-09-17 RX ORDER — LORAZEPAM 0.5 MG/1
0.5 TABLET ORAL
Qty: 30 TABLET | Refills: 0 | Status: SHIPPED | OUTPATIENT
Start: 2024-09-17

## 2024-09-17 NOTE — TELEPHONE ENCOUNTER
Refill request for the following medication (s) listed below.    Pending Prescriptions:                       Disp   Refills    LORazepam (ATIVAN) 0.5 MG tablet          30 tab*0            Sig: Take 1 tablet (0.5 mg) by mouth nightly as needed           for anxiety.      Last office visit provider:  4/22/2024  Next appointment scheduled: None      Medication T'd for review and signature

## 2024-09-28 ENCOUNTER — HEALTH MAINTENANCE LETTER (OUTPATIENT)
Age: 59
End: 2024-09-28

## 2024-11-11 ENCOUNTER — MYC REFILL (OUTPATIENT)
Dept: NEUROLOGY | Facility: CLINIC | Age: 59
End: 2024-11-11
Payer: COMMERCIAL

## 2024-11-11 DIAGNOSIS — F41.9 ANXIETY: ICD-10-CM

## 2024-11-11 DIAGNOSIS — G35 MS (MULTIPLE SCLEROSIS) (H): ICD-10-CM

## 2024-11-11 DIAGNOSIS — F51.04 CHRONIC INSOMNIA: ICD-10-CM

## 2024-11-11 RX ORDER — TRAZODONE HYDROCHLORIDE 50 MG/1
100 TABLET, FILM COATED ORAL AT BEDTIME
Qty: 60 TABLET | Refills: 3 | Status: SHIPPED | OUTPATIENT
Start: 2024-11-11

## 2024-11-11 RX ORDER — LORAZEPAM 0.5 MG/1
0.5 TABLET ORAL
Qty: 30 TABLET | Refills: 0 | Status: SHIPPED | OUTPATIENT
Start: 2024-11-11

## 2024-11-11 RX ORDER — TRAZODONE HYDROCHLORIDE 50 MG/1
100 TABLET, FILM COATED ORAL AT BEDTIME
Qty: 60 TABLET | Refills: 3 | Status: CANCELLED | OUTPATIENT
Start: 2024-11-11

## 2024-11-11 RX ORDER — DEXTROAMPHETAMINE SACCHARATE, AMPHETAMINE ASPARTATE, DEXTROAMPHETAMINE SULFATE AND AMPHETAMINE SULFATE 3.75; 3.75; 3.75; 3.75 MG/1; MG/1; MG/1; MG/1
30 TABLET ORAL 2 TIMES DAILY
Qty: 120 TABLET | Refills: 0 | Status: SHIPPED | OUTPATIENT
Start: 2024-11-11

## 2024-11-11 NOTE — TELEPHONE ENCOUNTER
Pending Prescriptions:                       Disp   Refills    traZODone (DESYREL) 50 MG tablet          60 tab*3            Sig: Take 2 tablets (100 mg) by mouth at bedtime.    amphetamine-dextroamphetamine (ADDERALL) *120 ta*0            Sig: Take 2 tablets (30 mg) by mouth 2 times daily.    LORazepam (ATIVAN) 0.5 MG tablet          30 tab*0            Sig: Take 1 tablet (0.5 mg) by mouth nightly as needed           for anxiety.      Pt was last seen in April 2024. Per your notes, pt was to get an MRI in 6 months and to follow up after the MRI. I did not locate an MRI being done and there is no future follow up appt.          Medication T'd for review and signature

## 2024-12-11 DIAGNOSIS — G82.50 SPASTIC QUADRIPARESIS (H): ICD-10-CM

## 2024-12-11 NOTE — TELEPHONE ENCOUNTER
Pending Prescriptions:                       Disp   Refills    tiZANidine (ZANAFLEX) 4 MG tablet         60 tab*11           Sig: Take 2 tablets (8 mg) by mouth at bedtime.    Medication T'd for review and signature        MANISH Garcia on 12/11/2024 at 10:25 AM

## 2025-01-07 ENCOUNTER — LAB REQUISITION (OUTPATIENT)
Dept: LAB | Facility: CLINIC | Age: 60
End: 2025-01-07
Payer: COMMERCIAL

## 2025-01-07 DIAGNOSIS — E03.9 HYPOTHYROIDISM, UNSPECIFIED: ICD-10-CM

## 2025-01-07 LAB
T4 FREE SERPL-MCNC: 0.9 NG/DL (ref 0.9–1.7)
TSH SERPL DL<=0.005 MIU/L-ACNC: 9.22 UIU/ML (ref 0.3–4.2)

## 2025-01-07 PROCEDURE — 84443 ASSAY THYROID STIM HORMONE: CPT | Mod: ORL

## 2025-01-07 PROCEDURE — 84439 ASSAY OF FREE THYROXINE: CPT | Mod: ORL

## 2025-01-08 ENCOUNTER — MYC REFILL (OUTPATIENT)
Dept: NEUROLOGY | Facility: CLINIC | Age: 60
End: 2025-01-08
Payer: COMMERCIAL

## 2025-01-08 DIAGNOSIS — G35 MS (MULTIPLE SCLEROSIS) (H): ICD-10-CM

## 2025-01-08 DIAGNOSIS — F41.9 ANXIETY: ICD-10-CM

## 2025-01-08 RX ORDER — LORAZEPAM 0.5 MG/1
0.5 TABLET ORAL
Qty: 30 TABLET | Refills: 0 | Status: SHIPPED | OUTPATIENT
Start: 2025-01-08

## 2025-01-08 RX ORDER — DEXTROAMPHETAMINE SACCHARATE, AMPHETAMINE ASPARTATE, DEXTROAMPHETAMINE SULFATE AND AMPHETAMINE SULFATE 3.75; 3.75; 3.75; 3.75 MG/1; MG/1; MG/1; MG/1
30 TABLET ORAL 2 TIMES DAILY
Qty: 120 TABLET | Refills: 0 | Status: SHIPPED | OUTPATIENT
Start: 2025-01-08

## 2025-01-08 NOTE — TELEPHONE ENCOUNTER
Pending Prescriptions:                       Disp   Refills    amphetamine-dextroamphetamine (ADDERALL) *120 ta*0            Sig: Take 2 tablets (30 mg) by mouth 2 times daily.    LORazepam (ATIVAN) 0.5 MG tablet          30 tab*0            Sig: Take 1 tablet (0.5 mg) by mouth nightly as needed           for anxiety.    Last seen: 4/22/2024    No future appt.

## 2025-01-18 ENCOUNTER — HEALTH MAINTENANCE LETTER (OUTPATIENT)
Age: 60
End: 2025-01-18

## 2025-02-10 ENCOUNTER — TELEPHONE (OUTPATIENT)
Dept: NEUROLOGY | Facility: CLINIC | Age: 60
End: 2025-02-10
Payer: COMMERCIAL

## 2025-02-10 ENCOUNTER — MYC REFILL (OUTPATIENT)
Dept: NEUROLOGY | Facility: CLINIC | Age: 60
End: 2025-02-10
Payer: COMMERCIAL

## 2025-02-10 DIAGNOSIS — G35 MULTIPLE SCLEROSIS (H): ICD-10-CM

## 2025-02-10 DIAGNOSIS — F51.04 CHRONIC INSOMNIA: ICD-10-CM

## 2025-02-10 DIAGNOSIS — G82.50 SPASTIC QUADRIPARESIS (H): ICD-10-CM

## 2025-02-10 DIAGNOSIS — M79.2 NEUROPATHIC PAIN: ICD-10-CM

## 2025-02-10 DIAGNOSIS — G35 MS (MULTIPLE SCLEROSIS) (H): ICD-10-CM

## 2025-02-10 DIAGNOSIS — F41.9 ANXIETY: ICD-10-CM

## 2025-02-10 RX ORDER — TRAZODONE HYDROCHLORIDE 50 MG/1
100 TABLET ORAL AT BEDTIME
Qty: 60 TABLET | Refills: 3 | OUTPATIENT
Start: 2025-02-10

## 2025-02-10 RX ORDER — DULOXETIN HYDROCHLORIDE 60 MG/1
60 CAPSULE, DELAYED RELEASE ORAL EVERY EVENING
Qty: 30 CAPSULE | Refills: 3 | OUTPATIENT
Start: 2025-02-10

## 2025-02-10 RX ORDER — DEXTROAMPHETAMINE SACCHARATE, AMPHETAMINE ASPARTATE, DEXTROAMPHETAMINE SULFATE AND AMPHETAMINE SULFATE 3.75; 3.75; 3.75; 3.75 MG/1; MG/1; MG/1; MG/1
30 TABLET ORAL 2 TIMES DAILY
Qty: 120 TABLET | Refills: 0 | OUTPATIENT
Start: 2025-02-10

## 2025-02-10 RX ORDER — GABAPENTIN 300 MG/1
900 CAPSULE ORAL AT BEDTIME
Qty: 270 CAPSULE | Refills: 3 | OUTPATIENT
Start: 2025-02-10

## 2025-02-10 RX ORDER — LORAZEPAM 0.5 MG/1
0.5 TABLET ORAL
Qty: 30 TABLET | Refills: 0 | OUTPATIENT
Start: 2025-02-10

## 2025-02-10 NOTE — TELEPHONE ENCOUNTER
Pending Prescriptions:                       Disp   Refills    traZODone (DESYREL) 50 MG tablet          60 tab*3            Sig: Take 2 tablets (100 mg) by mouth at bedtime.    amphetamine-dextroamphetamine (ADDERALL) *120 ta*0            Sig: Take 2 tablets (30 mg) by mouth 2 times daily.    LORazepam (ATIVAN) 0.5 MG tablet          30 tab*0            Sig: Take 1 tablet (0.5 mg) by mouth nightly as needed           for anxiety.    DULoxetine (CYMBALTA) 60 MG capsule       30 cap*3            Sig: Take 1 capsule (60 mg) by mouth every evening.    gabapentin (NEURONTIN) 300 MG capsule     270 ca*3            Sig: Take 3 capsules (900 mg) by mouth at bedtime.      Last visit: 4/22/24  No upcoming appt      Medication T'd for review and signature    MANISH Garcia on 2/10/2025 at 10:42 AM'

## 2025-02-10 NOTE — TELEPHONE ENCOUNTER
Dr. Montero,    I sent a [employer] message to the pt from earlier about her refill meds being denied due to not having an appointment.    Pt made a follow up appointment but it is a virtual visit with you on 2/27/2025. Is a virtual appt ok or do you need the pt to come in clinic?    Per pt, the meds she requested refills on are     LORazepam (ATIVAN) 0.5 MG tablet   traZODone (DESYREL) 50 MG tablet    tiZANidine (ZANAFLEX) 4 MG tablet     amphetamine-dextroamphetamine (ADDERALL) 15 MG tablet    Please advise. Thank you.      MANISH Garcia on 2/10/2025 at 2:19 PM

## 2025-02-10 NOTE — TELEPHONE ENCOUNTER
[facility] Call Center    Phone Message    May a detailed message be left on voicemail: yes     Reason for Call: Medication Refill Request    Has the patient contacted the pharmacy for the refill? Yes   Name of medication being requested: LORazepam (ATIVAN) 0.5 MG tablet    traZODone (DESYREL) 50 MG tablet     tiZANidine (ZANAFLEX) 4 MG tablet      amphetamine-dextroamphetamine (ADDERALL) 15 MG tablet    Provider who prescribed the medication: Dr. Montero   Pharmacy: [id number] - [place], [place] - [address]  Date medication is needed: 2/10/25    Pt is requesting a refill on the medications listed above. PT did scheduled a follow up with provider on 2/27/25. PT would like a call from care team once medications have been sent to her pharmacy. Please contact Pt at [phone]     Action Taken: Message routed to:  Other: [facility] Neurology     Travel Screening: Not Applicable

## 2025-02-11 RX ORDER — TRAZODONE HYDROCHLORIDE 50 MG/1
100 TABLET ORAL AT BEDTIME
Qty: 60 TABLET | Refills: 0 | Status: SHIPPED | OUTPATIENT
Start: 2025-02-11

## 2025-02-11 RX ORDER — DEXTROAMPHETAMINE SACCHARATE, AMPHETAMINE ASPARTATE, DEXTROAMPHETAMINE SULFATE AND AMPHETAMINE SULFATE 3.75; 3.75; 3.75; 3.75 MG/1; MG/1; MG/1; MG/1
30 TABLET ORAL 2 TIMES DAILY
Qty: 120 TABLET | Refills: 0 | Status: SHIPPED | OUTPATIENT
Start: 2025-02-11

## 2025-02-11 RX ORDER — LORAZEPAM 0.5 MG/1
0.5 TABLET ORAL
Qty: 30 TABLET | Refills: 0 | Status: SHIPPED | OUTPATIENT
Start: 2025-02-11

## 2025-02-11 NOTE — TELEPHONE ENCOUNTER
Virtual is okay   Thank you!     Rx's sent to pharmacy   Gale Montero MD on 2/11/2025 at 1:45 PM

## 2025-02-11 NOTE — TELEPHONE ENCOUNTER
Noted.      Sally msg sent to the pt letting her know meds refilled.      MANISH Garcia on 2/11/2025 at 2:09 PM

## 2025-02-12 ENCOUNTER — TELEPHONE (OUTPATIENT)
Dept: NEUROLOGY | Facility: CLINIC | Age: 60
End: 2025-02-12
Payer: COMMERCIAL

## 2025-02-12 DIAGNOSIS — G47.14 HYPERSOMNIA DUE TO ANOTHER MEDICAL CONDITION: Primary | ICD-10-CM

## 2025-02-16 NOTE — TELEPHONE ENCOUNTER
Retail Pharmacy Prior Authorization Team   Phone: [phone]    PA Initiation    Medication: amphetamine-dextroamphetamine (ADDERALL) 15 MG tablet   Insurance Company: [employer] - Phone [phone] Fax [phone]  Pharmacy Filling the Rx: [id number] - [place], [place] - [address]  Filling Pharmacy Phone: [phone]  Filling Pharmacy Fax: [phone]  Start Date: 2/16/2025

## 2025-02-17 NOTE — TELEPHONE ENCOUNTER
PRIOR AUTHORIZATION DENIED    Medication: amphetamine-dextroamphetamine (ADDERALL) 15 MG tablet - DENIED    Denial Date: 2/17/2025    Denial Rational: INSURANCE STATES PLAN LIMIT IS 60 TABS PER 30 DAYS AND MEDICATION NEEDS TO BE USED FOR A MEDICALLY ACCEPTED INDICATION.        Appeal Information:  IF YOU WOULD LIKE TO APPEAL PLEASE SUPPLY PA TEAM WITH A LETTER OF MEDICAL NECESSITY WITH CLINICAL REASON.

## 2025-02-18 RX ORDER — DEXTROAMPHETAMINE SACCHARATE, AMPHETAMINE ASPARTATE, DEXTROAMPHETAMINE SULFATE AND AMPHETAMINE SULFATE 7.5; 7.5; 7.5; 7.5 MG/1; MG/1; MG/1; MG/1
30 TABLET ORAL 2 TIMES DAILY
Qty: 60 TABLET | Refills: 0 | Status: SHIPPED | OUTPATIENT
Start: 2025-02-18

## 2025-02-18 NOTE — TELEPHONE ENCOUNTER
Rx for adderall 30 mg twice per day sent to [facility] with new diagnosis     Please notify patient of change in dose of pill     Thanks, Gale Montero MD on 2/18/2025 at 9:56 AM

## 2025-02-18 NOTE — TELEPHONE ENCOUNTER
Called pt and relayed that dosage of adderall was changed.     She verbalized understanding and was thankful for the change. She had no further questions at this time.     Keri RN, BSN  [facility] Neurology

## 2025-02-27 ENCOUNTER — VIRTUAL VISIT (OUTPATIENT)
Dept: NEUROLOGY | Facility: CLINIC | Age: 60
End: 2025-02-27
Payer: COMMERCIAL

## 2025-02-27 ENCOUNTER — TELEPHONE (OUTPATIENT)
Dept: NEUROLOGY | Facility: CLINIC | Age: 60
End: 2025-02-27

## 2025-02-27 DIAGNOSIS — M79.2 NEUROPATHIC PAIN: ICD-10-CM

## 2025-02-27 DIAGNOSIS — G81.91 RIGHT HEMIPLEGIA (H): ICD-10-CM

## 2025-02-27 DIAGNOSIS — G82.50 SPASTIC QUADRIPARESIS (H): ICD-10-CM

## 2025-02-27 DIAGNOSIS — G35 MULTIPLE SCLEROSIS (H): ICD-10-CM

## 2025-02-27 DIAGNOSIS — F51.04 CHRONIC INSOMNIA: ICD-10-CM

## 2025-02-27 DIAGNOSIS — G35 MS (MULTIPLE SCLEROSIS) (H): Primary | ICD-10-CM

## 2025-02-27 RX ORDER — DULOXETIN HYDROCHLORIDE 60 MG/1
60 CAPSULE, DELAYED RELEASE ORAL EVERY EVENING
Qty: 90 CAPSULE | Refills: 3 | Status: SHIPPED | OUTPATIENT
Start: 2025-02-27

## 2025-02-27 RX ORDER — GABAPENTIN 300 MG/1
1200 CAPSULE ORAL AT BEDTIME
Qty: 360 CAPSULE | Refills: 3 | Status: SHIPPED | OUTPATIENT
Start: 2025-02-27

## 2025-02-27 RX ORDER — TRAZODONE HYDROCHLORIDE 50 MG/1
100 TABLET ORAL AT BEDTIME
Qty: 180 TABLET | Refills: 3 | Status: SHIPPED | OUTPATIENT
Start: 2025-02-27

## 2025-02-27 NOTE — PATIENT INSTRUCTIONS
You reported an increase in nerve pain at night that is affecting your sleep   I have recommended that you increase gabapentin to 1200 mg (4 capsules) at bedtime     Continue your other medications   I have renewed:   Duloxetine   Tizanidine   Trazodone     Continue copaxone     I have placed a referral to home care     Follow up with me in 6 months

## 2025-02-27 NOTE — PROGRESS NOTES
Date of Service: 2/27/2025    [facility] Neurology   [facility] Follow-up     Subjective: 61 y/o woman who presents in follow up for multiple sclerosis     No discrete new symptoms     Right side getting weaker - hand and leg   Left hand now getting weaker   Making it hard to do activities of daily living such as cleaning or dishes    Can stand with support for up to 15 minutes then needs break because legs fatigue   Gait limited to 20 feet iwht aid of walker     Falls approx 1 time per month   Usually loses balance   No major injuries     Had cataract surgery   Vision clearer     Neuropathic pain   Burning tingling   In feet   Bothersome at night and affects sleep   Finds gabapentin most helpful   Taking 900 mg at bedtime     Continues with glatiramer   Tolerating   No interruptions in therapy     Recently lost mother unexpectedly   Managing grief   Has support from family    No Known Allergies    Current Outpatient Medications   Medication Sig Dispense Refill    amphetamine-dextroamphetamine (ADDERALL) 30 MG tablet Take 1 tablet (30 mg) by mouth 2 times daily. 60 tablet 0    DULoxetine (CYMBALTA) 60 MG capsule Take 1 capsule (60 mg) by mouth every evening. 90 capsule 3    gabapentin (NEURONTIN) 300 MG capsule Take 4 capsules (1,200 mg) by mouth at bedtime. 360 capsule 3    glatiramer acetate 40 MG/ML injection Inject 40 mg Subcutaneous three times a week 12 mL 11    levothyroxine (SYNTHROID, LEVOTHROID) 75 MCG tablet [LEVOTHYROXINE (SYNTHROID, LEVOTHROID) 75 MCG TABLET] Take 75 mcg by mouth daily.      LORazepam (ATIVAN) 0.5 MG tablet Take 1 tablet (0.5 mg) by mouth nightly as needed for anxiety. 30 tablet 0    SUMAtriptan (IMITREX) 50 MG tablet [SUMATRIPTAN (IMITREX) 50 MG TABLET] Take 50 mg by mouth 2 (two) times a day as needed for migraine.      tiZANidine (ZANAFLEX) 4 MG tablet Take 2 tablets (8 mg) by mouth at bedtime. 180 tablet 3    traZODone (DESYREL) 50 MG tablet Take 2 tablets (100 mg) by mouth at bedtime.  180 tablet 3    triamcinolone (KENALOG) 0.1 % external cream Apply topically 2 times daily as needed for irritation 80 g 0     No current facility-administered medications for this visit.        Past medical, surgical, social and family history was personally reviewed. Pertinent details noted above.     Physical Examination:   There were no vitals taken for this visit.    General: no acute distress  Speech is fluent and prosodic with appropriate responses to questions     Tests/Imaging:   No new relevant tests    Assessment: 61 y/o woman with chronic ms who is experiencing the expected decline in function     I agree that she would benefit from assistance with activities of daily living given that her clinical course is expected to progress and she is having difficulty functioning independently. Referral to home care placed     She will continue with her current medications for chronic management of muscle tightness, neuropathic pain and insomnia    I have recommended increasing gabapentin to 1200 mg at bedtime.  If further increases in dosage are needed, then she will need to take additional doses during the daytime     Plan:   - increase gabapentin to 1200 mg hs   - continue duloxetine, trazodone, tizanidine   - continue glatiramer   - follow up in 6 months     Note was completed with the assistance of Dragon Fluency software which can often result in accidental word substitutions.   The longitudinal plan of care for the diagnosis(es)/condition(s) as documented were addressed during this visit. Due to the added complexity in care, I will continue to support Linda in the subsequent management and with ongoing continuity of care.    A total of 28 minutes on the date of service were spent in the care of this patient.   Gale Montero MD on 2/27/2025 at 10:03 AM        Virtual Visit Details    Type of service:  Video Visit     Originating Location (pt. Location): Home    Distant Location (provider location):   On-site  Platform used for Video Visit: Jon

## 2025-02-27 NOTE — TELEPHONE ENCOUNTER
Please schedule patient for follow up in 6 months per providers checkout notes.      Sheila Bills on 2/27/2025 at 12:43 PM

## 2025-02-27 NOTE — LETTER
2/27/2025      Chelsi Robertson  [address] [facility] [facility] [address]      Dear Colleague,    Thank you for referring your patient, Chelsi Robertson, to the [facility]. Please see a copy of my visit note below.    Date of Service: 2/27/2025    [facility] Neurology   [facility] Clinic Follow-up     Subjective: 59 y/o woman who presents in follow up for multiple sclerosis     No discrete new symptoms     Right side getting weaker - hand and leg   Left hand now getting weaker   Making it hard to do activities of daily living such as cleaning or dishes    Can stand with support for up to 15 minutes then needs break because legs fatigue   Gait limited to 20 feet iwht aid of walker     Falls approx 1 time per month   Usually loses balance   No major injuries     Had cataract surgery   Vision clearer     Neuropathic pain   Burning tingling   In feet   Bothersome at night and affects sleep   Finds gabapentin most helpful   Taking 900 mg at bedtime     Continues with glatiramer   Tolerating   No interruptions in therapy     Recently lost mother unexpectedly   Managing grief   Has support from family    No Known Allergies    Current Outpatient Medications   Medication Sig Dispense Refill     amphetamine-dextroamphetamine (ADDERALL) 30 MG tablet Take 1 tablet (30 mg) by mouth 2 times daily. 60 tablet 0     DULoxetine (CYMBALTA) 60 MG capsule Take 1 capsule (60 mg) by mouth every evening. 90 capsule 3     gabapentin (NEURONTIN) 300 MG capsule Take 4 capsules (1,200 mg) by mouth at bedtime. 360 capsule 3     glatiramer acetate 40 MG/ML injection Inject 40 mg Subcutaneous three times a week 12 mL 11     levothyroxine (SYNTHROID, LEVOTHROID) 75 MCG tablet [LEVOTHYROXINE (SYNTHROID, LEVOTHROID) 75 MCG TABLET] Take 75 mcg by mouth daily.       LORazepam (ATIVAN) 0.5 MG tablet Take 1 tablet (0.5 mg) by mouth nightly as needed for anxiety. 30 tablet 0     SUMAtriptan (IMITREX) 50 MG tablet  [SUMATRIPTAN (IMITREX) 50 MG TABLET] Take 50 mg by mouth 2 (two) times a day as needed for migraine.       tiZANidine (ZANAFLEX) 4 MG tablet Take 2 tablets (8 mg) by mouth at bedtime. 180 tablet 3     traZODone (DESYREL) 50 MG tablet Take 2 tablets (100 mg) by mouth at bedtime. 180 tablet 3     triamcinolone (KENALOG) 0.1 % external cream Apply topically 2 times daily as needed for irritation 80 g 0     No current facility-administered medications for this visit.        Past medical, surgical, social and family history was personally reviewed. Pertinent details noted above.     Physical Examination:   There were no vitals taken for this visit.    General: no acute distress  Speech is fluent and prosodic with appropriate responses to questions     Tests/Imaging:   No new relevant tests    Assessment: 61 y/o woman with chronic ms who is experiencing the expected decline in function     I agree that she would benefit from assistance with activities of daily living given that her clinical course is expected to progress and she is having difficulty functioning independently. Referral to home care placed     She will continue with her current medications for chronic management of muscle tightness, neuropathic pain and insomnia    I have recommended increasing gabapentin to 1200 mg at bedtime.  If further increases in dosage are needed, then she will need to take additional doses during the daytime     Plan:   - increase gabapentin to 1200 mg hs   - continue duloxetine, trazodone, tizanidine   - continue glatiramer   - follow up in 6 months     Note was completed with the assistance of Dragon Fluency software which can often result in accidental word substitutions.   The longitudinal plan of care for the diagnosis(es)/condition(s) as documented were addressed during this visit. Due to the added complexity in care, I will continue to support Linda in the subsequent management and with ongoing continuity of care.    A total  of 28 minutes on the date of service were spent in the care of this patient.   Gale Montero MD on 2/27/2025 at 10:03 AM        Virtual Visit Details    Type of service:  Video Visit     Originating Location (pt. Location): Home    Distant Location (provider location):  On-site  Platform used for Video Visit: Jon      Again, thank you for allowing me to participate in the care of your patient.        Sincerely,        Gale Montero MD    Electronically signed

## 2025-02-27 NOTE — NURSING NOTE
Current patient location: [address] [place] [id number]    Is the patient currently in the state of [place]? YES    Visit mode: VIDEO    If the visit is dropped, the patient can be reconnected by:VIDEO VISIT: Text to cell phone:   Telephone Information:   Mobile [phone]       Will anyone else be joining the visit? NO  (If patient encounters technical issues they should call [phone]    Are changes needed to the allergy or medication list? Yes requested removal of a medication    Are refills needed on medications prescribed by this physician? NO    Rooming Documentation:  Not applicable    Reason for visit: RECHECK (Return MS)    Sheila Sanju VVF    Pt states she has usual pain in back and legs.    Pt states her mother just passed away.  So she is going through the grieving process.

## 2025-03-17 ENCOUNTER — TELEPHONE (OUTPATIENT)
Dept: NEUROLOGY | Facility: CLINIC | Age: 60
End: 2025-03-17
Payer: COMMERCIAL

## 2025-03-17 NOTE — TELEPHONE ENCOUNTER
[facility] Call Center    Phone Message    May a detailed message be left on voicemail: yes     Reason for Call: Order(s): Home Care Orders: Skilled Nursing:     Pt is requesting to cancel In Home Health Aid.  Emely with [facility] is requesting verbal orders to discontinue this.  Please contact Emely at: [phone]    Action Taken: Other: Neurology     Travel Screening: Not Applicable     Date of Service:

## 2025-03-17 NOTE — TELEPHONE ENCOUNTER
Dr. Montero ordered home care, pt requesting to no longer have home aid services.     Home care needs verbal order to discontinue this, routing to covering provider. Is it ok for RN to provider verbal order to cancel home health aid services for patient?     Srinivas NELSON RN, BSN  [facility] Neurology

## 2025-03-18 NOTE — TELEPHONE ENCOUNTER
Returned call to Emely- gave verbal order to cancel home aid services.     Alma DEE RN, BSN  [facility] Neurology

## 2025-03-20 ENCOUNTER — TELEPHONE (OUTPATIENT)
Dept: NEUROLOGY | Facility: CLINIC | Age: 60
End: 2025-03-20
Payer: COMMERCIAL

## 2025-03-20 NOTE — TELEPHONE ENCOUNTER
[facility]    Phone Message    May a detailed message be left on voicemail: yes     Reason for Call: Order(s): Home Care Orders: Occupational Therapy (OT): OT eval to be moved to next week, per pt request.    Action Taken: Message routed to:  Other: [facility]OLOG[facility]    Travel Screening: Not Applicable     Date of Service:

## 2025-03-21 DIAGNOSIS — F41.9 ANXIETY: ICD-10-CM

## 2025-03-21 NOTE — TELEPHONE ENCOUNTER
Refill request for lorazepam pended to Dr Montero. Last visit Feb 2025 with follow-up scheduled for August. Last visit note does not mention continuing the lorazepam, so pending to Dr Montero for approval or denial, rather than sending to covering provider (Dr Montero currently out of clinic).    Francesca Moncada RN

## 2025-03-24 RX ORDER — LORAZEPAM 0.5 MG/1
0.5 TABLET ORAL
Qty: 30 TABLET | Refills: 0 | Status: SHIPPED | OUTPATIENT
Start: 2025-03-24

## 2025-03-27 DIAGNOSIS — Z53.9 DIAGNOSIS NOT YET DEFINED: Primary | ICD-10-CM

## 2025-03-27 PROCEDURE — G0180 MD CERTIFICATION HHA PATIENT: HCPCS | Performed by: PSYCHIATRY & NEUROLOGY

## 2025-03-31 ENCOUNTER — MEDICAL CORRESPONDENCE (OUTPATIENT)
Dept: HEALTH INFORMATION MANAGEMENT | Facility: CLINIC | Age: 60
End: 2025-03-31
Payer: COMMERCIAL

## 2025-04-03 ENCOUNTER — MEDICAL CORRESPONDENCE (OUTPATIENT)
Dept: HEALTH INFORMATION MANAGEMENT | Facility: CLINIC | Age: 60
End: 2025-04-03
Payer: COMMERCIAL

## 2025-04-30 ENCOUNTER — MYC REFILL (OUTPATIENT)
Dept: NEUROLOGY | Facility: CLINIC | Age: 60
End: 2025-04-30
Payer: COMMERCIAL

## 2025-04-30 DIAGNOSIS — F41.9 ANXIETY: ICD-10-CM

## 2025-04-30 DIAGNOSIS — G47.14 HYPERSOMNIA DUE TO ANOTHER MEDICAL CONDITION: ICD-10-CM

## 2025-05-01 RX ORDER — LORAZEPAM 0.5 MG/1
0.5 TABLET ORAL
Qty: 30 TABLET | Refills: 0 | Status: SHIPPED | OUTPATIENT
Start: 2025-05-01

## 2025-05-01 RX ORDER — DEXTROAMPHETAMINE SACCHARATE, AMPHETAMINE ASPARTATE, DEXTROAMPHETAMINE SULFATE AND AMPHETAMINE SULFATE 7.5; 7.5; 7.5; 7.5 MG/1; MG/1; MG/1; MG/1
30 TABLET ORAL 2 TIMES DAILY
Qty: 60 TABLET | Refills: 0 | Status: SHIPPED | OUTPATIENT
Start: 2025-05-01

## 2025-05-01 NOTE — TELEPHONE ENCOUNTER
Pending Prescriptions:                       Disp   Refills    amphetamine-dextroamphetamine (ADDERALL) *60 tab*0            Sig: Take 1 tablet (30 mg) by mouth 2 times daily.    LORazepam (ATIVAN) 0.5 MG tablet          30 tab*0            Sig: Take 1 tablet (0.5 mg) by mouth nightly as needed           for anxiety.    Prescriptions sent to Dr. Montero for signature.    Bren Edwards RN, BSN  [facility] Neurology

## 2025-08-05 ENCOUNTER — MYC REFILL (OUTPATIENT)
Dept: NEUROLOGY | Facility: CLINIC | Age: 60
End: 2025-08-05
Payer: COMMERCIAL

## 2025-08-05 DIAGNOSIS — F41.9 ANXIETY: ICD-10-CM

## 2025-08-05 DIAGNOSIS — F51.04 CHRONIC INSOMNIA: ICD-10-CM

## 2025-08-05 DIAGNOSIS — G47.14 HYPERSOMNIA DUE TO ANOTHER MEDICAL CONDITION: ICD-10-CM

## 2025-08-05 DIAGNOSIS — M79.2 NEUROPATHIC PAIN: ICD-10-CM

## 2025-08-05 DIAGNOSIS — G35 MULTIPLE SCLEROSIS (H): ICD-10-CM

## 2025-08-05 DIAGNOSIS — G82.50 SPASTIC QUADRIPARESIS (H): ICD-10-CM

## 2025-08-05 RX ORDER — TRAZODONE HYDROCHLORIDE 50 MG/1
100 TABLET ORAL AT BEDTIME
Qty: 180 TABLET | Refills: 0 | Status: SHIPPED | OUTPATIENT
Start: 2025-08-05

## 2025-08-05 RX ORDER — DEXTROAMPHETAMINE SACCHARATE, AMPHETAMINE ASPARTATE, DEXTROAMPHETAMINE SULFATE AND AMPHETAMINE SULFATE 7.5; 7.5; 7.5; 7.5 MG/1; MG/1; MG/1; MG/1
30 TABLET ORAL 2 TIMES DAILY
Qty: 60 TABLET | Refills: 0 | Status: SHIPPED | OUTPATIENT
Start: 2025-08-05

## 2025-08-05 RX ORDER — DULOXETIN HYDROCHLORIDE 60 MG/1
60 CAPSULE, DELAYED RELEASE ORAL EVERY EVENING
Qty: 90 CAPSULE | Refills: 0 | Status: SHIPPED | OUTPATIENT
Start: 2025-08-05

## 2025-08-05 RX ORDER — LORAZEPAM 0.5 MG/1
0.5 TABLET ORAL
Qty: 30 TABLET | Refills: 0 | Status: SHIPPED | OUTPATIENT
Start: 2025-08-05

## 2025-08-05 RX ORDER — GABAPENTIN 300 MG/1
1200 CAPSULE ORAL AT BEDTIME
Qty: 360 CAPSULE | Refills: 0 | Status: SHIPPED | OUTPATIENT
Start: 2025-08-05